# Patient Record
Sex: FEMALE | Race: WHITE | NOT HISPANIC OR LATINO | Employment: OTHER | URBAN - METROPOLITAN AREA
[De-identification: names, ages, dates, MRNs, and addresses within clinical notes are randomized per-mention and may not be internally consistent; named-entity substitution may affect disease eponyms.]

---

## 2019-01-07 ENCOUNTER — TELEPHONE (OUTPATIENT)
Dept: ORTHOPEDICS | Facility: CLINIC | Age: 64
End: 2019-01-07

## 2019-01-07 DIAGNOSIS — M25.561 RIGHT KNEE PAIN, UNSPECIFIED CHRONICITY: Primary | ICD-10-CM

## 2019-01-08 ENCOUNTER — OFFICE VISIT (OUTPATIENT)
Dept: ORTHOPEDICS | Facility: CLINIC | Age: 64
End: 2019-01-08
Payer: MEDICAID

## 2019-01-08 VITALS — HEIGHT: 66 IN | BODY MASS INDEX: 35.36 KG/M2 | WEIGHT: 220 LBS

## 2019-01-08 DIAGNOSIS — G89.29 CHRONIC PAIN OF RIGHT KNEE: Primary | ICD-10-CM

## 2019-01-08 DIAGNOSIS — M25.561 CHRONIC PAIN OF RIGHT KNEE: Primary | ICD-10-CM

## 2019-01-08 DIAGNOSIS — M17.11 PRIMARY OSTEOARTHRITIS OF RIGHT KNEE: ICD-10-CM

## 2019-01-08 PROCEDURE — 20611 PR DRAIN/ASP/INJECT MAJOR JOINT/BURSA W/US GUIDANCE: ICD-10-PCS | Mod: S$PBB,RT,, | Performed by: ORTHOPAEDIC SURGERY

## 2019-01-08 PROCEDURE — 20611 DRAIN/INJ JOINT/BURSA W/US: CPT | Mod: S$PBB,RT,, | Performed by: ORTHOPAEDIC SURGERY

## 2019-01-08 PROCEDURE — 99999 PR PBB SHADOW E&M-EST. PATIENT-LVL II: CPT | Mod: PBBFAC,,, | Performed by: ORTHOPAEDIC SURGERY

## 2019-01-08 PROCEDURE — 99204 PR OFFICE/OUTPT VISIT, NEW, LEVL IV, 45-59 MIN: ICD-10-PCS | Mod: 25,S$PBB,, | Performed by: ORTHOPAEDIC SURGERY

## 2019-01-08 PROCEDURE — 99999 PR PBB SHADOW E&M-EST. PATIENT-LVL II: ICD-10-PCS | Mod: PBBFAC,,, | Performed by: ORTHOPAEDIC SURGERY

## 2019-01-08 PROCEDURE — 99212 OFFICE O/P EST SF 10 MIN: CPT | Mod: PBBFAC,PN | Performed by: ORTHOPAEDIC SURGERY

## 2019-01-08 PROCEDURE — 20611 DRAIN/INJ JOINT/BURSA W/US: CPT | Mod: PBBFAC,PN | Performed by: ORTHOPAEDIC SURGERY

## 2019-01-08 PROCEDURE — 99204 OFFICE O/P NEW MOD 45 MIN: CPT | Mod: 25,S$PBB,, | Performed by: ORTHOPAEDIC SURGERY

## 2019-01-08 RX ORDER — AMLODIPINE BESYLATE 10 MG/1
5 TABLET ORAL DAILY
COMMUNITY
End: 2019-11-18 | Stop reason: SDUPTHER

## 2019-01-08 RX ORDER — TRIAMCINOLONE ACETONIDE 40 MG/ML
40 INJECTION, SUSPENSION INTRA-ARTICULAR; INTRAMUSCULAR
Status: SHIPPED | OUTPATIENT
Start: 2019-01-08

## 2019-01-08 RX ORDER — MELOXICAM 7.5 MG/1
7.5 TABLET ORAL DAILY
COMMUNITY
End: 2019-11-18

## 2019-01-08 RX ORDER — LISINOPRIL AND HYDROCHLOROTHIAZIDE 12.5; 2 MG/1; MG/1
1 TABLET ORAL 2 TIMES DAILY
COMMUNITY
End: 2019-11-18 | Stop reason: SDUPTHER

## 2019-01-08 RX ORDER — GABAPENTIN 300 MG/1
300 CAPSULE ORAL DAILY
COMMUNITY
End: 2019-11-18

## 2019-01-08 RX ORDER — CETIRIZINE HYDROCHLORIDE 10 MG/1
10 TABLET ORAL DAILY
COMMUNITY
End: 2019-11-18 | Stop reason: SDUPTHER

## 2019-01-08 RX ORDER — LOVASTATIN 20 MG/1
20 TABLET ORAL NIGHTLY
COMMUNITY
End: 2019-02-04

## 2019-01-08 RX ORDER — OMEPRAZOLE 20 MG/1
20 CAPSULE, DELAYED RELEASE ORAL DAILY
COMMUNITY
End: 2019-11-18 | Stop reason: SDUPTHER

## 2019-01-08 NOTE — LETTER
January 8, 2019      Sarwat Hernandez, NP  [address] [place] [address]           [facility]  [address]  [address]  [place] [address]  Phone: [phone]  Fax: [phone]          Patient: Yumiko Boyd   MR Number: [id number]   YOB: 1955   Date of Visit: 1/8/2019       Dear Sarwat Hernandez:    Thank you for referring Yumiko Boyd to me for evaluation. Attached you will find relevant portions of my assessment and plan of care.    If you have questions, please do not hesitate to call me. I look forward to following Yumiko Boyd along with you.    Sincerely,    David Trivedi, DO    Enclosure  CC:  No Recipients    If you would like to receive this communication electronically, please contact [email or web address] or [phone] to request more information on [employer] access.    For providers and/or their staff who would like to refer a patient to [facility], please contact us through our one-stop-shop provider referral line, [facility] , at [phone].    If you feel you have received this communication in error or would no longer like to receive these types of communications, please e-mail [email or web address]

## 2019-01-08 NOTE — PROGRESS NOTES
"CC: right knee pain    63 y.o. Female presents today for evaluation of her right knee pain.   How long: Patient states her knee has been bothering her now for a couple months. Patient describes her pain as "burning" and is on medial portion of her knee. She states this will keep her up at night. Patient denies any injury or trauma. She went to the ED on 12/16/2018 for knee pain. She states that she woke up to go to the bathroom and was almost unable to make it to the bathroom due to her extreme pain and feeling like her knee was going to give out. She states that she went to the ED because she was concerned that she would break her hip if she fell.  What makes it better: Patient states she feels better when taking 300mg Gabapentin at night. She will soak in a warm bath which also makes her feel better.   What makes it worse: Patient states she feels the worst when standing and walking. Patient states she has a hard time coming downstairs.  Does it radiate: Patient reports occasional radiating pain into her lower leg and calf. She states sometimes it will radiate up into her hip.   Attempted treatments: Patient is using Voltaren gel and states it will take the burning away but not her pain. Patient was also prescribed Mobic 7.5 for her knee pain but states it is not working. Patient was given a shot of Toradol while at the ED but states before she left it had worn off. Patient states once a week she will take Tylenol or Ibuprofen if needed. Patient states she feels better when soaking in a warm bath.   Pain score: Patient states her pain is 6/10 today.   Any mechanical symptoms: Patient states every so often she will hear a pop in her knee.   Feelings of instability: Patient reports she will stumble every once in a while and catch herself. She states when walking she will reach for supports.   Affect on ADLs: Patient states she is taking more breaks from activities due to pain.     REVIEW OF SYSTEMS:   Constitution: " Patient denies fever, chills, night sweats, and weight changes.  Eyes: Patient denies eye pain or vision changes.  HENT: Patient denies headache, ear pain, sore throat, or nasal discharge.  CVS: Patient denies chest pain.  Lungs: Patient denies shortness of breath or cough.  Abd: Patient denies stomach pain, nausea, or vomiting.  Skin: Patient denies skin rash or itching.    Hematologic/Lymphatic: Patient denies easy bruising.   Musculoskeletal: Patient denies recent falls. See HPI.  Psych: Patient denies any current anxiety or nervousness.    PAST MEDICAL HISTORY:   History reviewed. No pertinent past medical history.    PAST SURGICAL HISTORY:   History reviewed. No pertinent surgical history.    FAMILY HISTORY:   History reviewed. No pertinent family history.    SOCIAL HISTORY:   Social History     Socioeconomic History    Marital status:      Spouse name: Not on file    Number of children: Not on file    Years of education: Not on file    Highest education level: Not on file   Social Needs    Financial resource strain: Not on file    Food insecurity - worry: Not on file    Food insecurity - inability: Not on file    Transportation needs - medical: Not on file    Transportation needs - non-medical: Not on file   Occupational History    Not on file   Tobacco Use    Smoking status: Not on file   Substance and Sexual Activity    Alcohol use: Not on file    Drug use: Not on file    Sexual activity: Not on file   Other Topics Concern    Not on file   Social History Narrative    Not on file       MEDICATIONS:     Current Outpatient Medications:     amLODIPine (NORVASC) 10 MG tablet, Take 10 mg by mouth once daily., Disp: , Rfl:     cetirizine (ZYRTEC) 10 MG tablet, Take 10 mg by mouth once daily., Disp: , Rfl:     gabapentin (NEURONTIN) 300 MG capsule, Take 300 mg by mouth once daily., Disp: , Rfl:     lisinopril-hydrochlorothiazide (PRINZIDE,ZESTORETIC) 20-12.5 mg per tablet, Take 1 tablet  "by mouth 2 (two) times daily., Disp: , Rfl:     lovastatin (MEVACOR) 20 MG tablet, Take 20 mg by mouth every evening., Disp: , Rfl:     meloxicam (MOBIC) 7.5 MG tablet, Take 7.5 mg by mouth once daily., Disp: , Rfl:     omeprazole (PRILOSEC) 20 MG capsule, Take 20 mg by mouth once daily., Disp: , Rfl:     Current Facility-Administered Medications:     triamcinolone acetonide injection 40 mg, 40 mg, Intra-articular, 1 time in Clinic/HOD, David Trivedi DO    ALLERGIES:   Review of patient's allergies indicates:  No Known Allergies     PHYSICAL EXAMINATION:  Ht 5' 6" (1.676 m)   Wt 99.8 kg (220 lb)   BMI 35.51 kg/m²   Vitals signs and nursing note have been reviewed.  General: In no acute distress, well developed, well nourished, no diaphoresis  Eyes: EOM full and smooth, no eye redness or discharge  HENT: normocephalic and atraumatic, neck supple, trachea midline, no nasal discharge, no external ear redness or discharge  Cardiovascular: 2+ and symmetric DP pulses bilaterally, no LE edema  Lungs: respirations non-labored, no conversational dyspnea   Abd: non-distended, no rigidity  MSK: no amputation or deformity, no swelling of extremities  Neuro: AAOx3, CN2-12 grossly intact  Skin: No rashes, warm and dry  Psychiatric: cooperative, pleasant, mood and affect appropriate for age    MUSCULOSKELETAL EXAM:    RIGHT KNEE EXAMINATION   Affected side is compared to contralateral knee     Observation:  No edema, erythema, ecchymosis, or effusion noted.  No muscle atrophy of the thighs and calves noted.  No obvious bony deformities noted.   No genu valgus/varum noted. No recurvatum noted.    No tibial internal/external torsion.      Tenderness:  Patella - none    Lateral joint line - none  Quad tendon - none   Medial joint line - present  Patellar tendon - none   Medial plica - none  Tibial tubercle - none   Lateral plica - none  Pes anserine - none   MCL prox - none  Distal ITB - none   MCL distal - none  MFC - " none    LCL prox - none  LFC - none    LCL distal - none  Tibia - none    Fibula - none    No obvious bursae, plicae, popliteal cysts, or tendon derangement palpated.          ROM:   Active extension to 0° on left without hyperextension, lag, or patellar J sign.   Active extension to 0° on right without hyperextension, lag, or patellar J sign.   Crepitus present bilaterally with extension  Active flexion to 135° on left and 135° on right.    Strength: (bilaterally)  Knee Flexion - 5/5 on left and 5/5 on right  Knee Extension - 5/5 on left and 5/5 on right  Hip Flexion - 5/5 on left and 5/5 on right  Hip Extension - 5/5 on left and 5/5 on right  Ankle dorsiflexion - 5/5 on left and 5/5 on right  Ankle Plantarflexion - 5/5 on left and 5/5 on right    Patellofemoral Exam:  Patellar ballottement - negative  Bulge sign - negative  Patellar grind - positive  No patellar laxity with medial and lateral translation   No apprehension with medial and lateral patellar translation.     Neurovascular Examination:   Normal gait without antalgia.  Sensation intact to light touch in the obturator, lateral/intermediate/medial/posterior femoral cutaneous, saphenous, and common peroneal nerves bilaterally.  Pulses intact at the DP and PT arteries bilaterally.    Capillary refill intact <2 seconds in all toes bilaterally.    IMAGIN. X-ray ordered due to right knee pain.  2. X-ray images were reviewed personally by me and then directly with patient.  3. FINDINGS: X-ray images obtained demonstrate some osteophyte formation most prominent on the medial compartments.. There is moderate joint space narrowing of both knees, most prominent in the medial and patellofemoral compartments.  4. IMPRESSION:  Moderate arthritic degenerative changes of both knees.    PROCEDURE:  Large Joint Aspiration/Injection  Knee joint, right  Performed by: JACEY DAVALOS  Authorized by: JACEY DAVALOS  Consent Done?: Yes (Verbal and writte)  Indications:  Pain  Site marked: The procedure site was marked   Timeout: Prior to procedure the correct patient, procedure, and site was verified   Location: Knee joint, right  Prep: Patient was prepped with Chlorhexidine and alcohol.  Skin anesthetic: Ethyl Chloride spray was used prior to skin puncture.  Ultrasound Guidance for needle placement: yes  Procedure: After local anesthetic was applied, the 25G, 1.5 needle was used to enter the knee joint capsule under US guidance. A 3 cc mixture of 1 cc of 40 mg/ml triamcinolone acetonide and 2 cc of 0.5% Bupivacaine Hydrochloride were injected into the knee joint.   Approach: superiorlateral  Medications: 40 mg triamcinolone acetonide 40 mg/mL  Patient tolerance: Patient tolerated the procedure well with no immediate complications    Ultrasound guidance was used for needle localization. Images were saved and stored for documentation. Short and long axis images of the anterior knee were taken prior to injection confirming presence of joint effusion. Dynamic visualization of the needle was continuous throughout the procedures.    Triamcinolone:  NDC: [address]-5  LOT: [id number]  EXP: 09/2020      ASSESSMENT:      ICD-10-CM ICD-9-CM   1. Chronic pain of right knee M25.561 719.46    G89.29 338.29   2. Primary osteoarthritis of right knee M17.11 715.16       PLAN:  1-2.  Right knee pain/OA -     - XRs ordered in the office today and images were personally reviewed with the patient. See above for further detail.    - We reviewed the natural history of osteoarthritis and a multipronged treatment approach. We reviewed the importance of addressing three different aspects to best manage this condition. Controlling the intra-articular immune reaction through medications and/or injections, improving local stability through bracing and/or injection, and improving functional stability through hip, core, and ankle strength, stability and mobility which may benefit from formal physical  therapy.    - We discussed options today, including medications, bracing, home exercises, physical therapy, injections. She has 2 sisters that have had some of these treatments done before, and today she states that she is interested in injections and bracing.    - She was fitted for a hinged knee brace in clinic today. She is looking forward to wearing this as she feels instability is a big issue for her.    - She also elected to have a cortisone injection done into her right knee. See above for further procedure detail.    - Continue with Mobic 7.5 mg daily.  Also continue with Tylenol as needed, but not to exceed 3000 mg daily.      Future planning includes - if not improved with above, consider home exercise program and formal physical therapy.  Visco injection series may be a good idea for her as well.    All questions were answered to the best of my ability and all concerns were addressed at this time.    Follow up in 4-6 weeks for above, or sooner if needed.

## 2019-02-04 ENCOUNTER — OFFICE VISIT (OUTPATIENT)
Dept: ORTHOPEDICS | Facility: CLINIC | Age: 64
End: 2019-02-04
Payer: MEDICAID

## 2019-02-04 VITALS
WEIGHT: 218 LBS | SYSTOLIC BLOOD PRESSURE: 140 MMHG | DIASTOLIC BLOOD PRESSURE: 70 MMHG | BODY MASS INDEX: 35.03 KG/M2 | HEIGHT: 66 IN

## 2019-02-04 DIAGNOSIS — M25.562 CHRONIC PAIN OF LEFT KNEE: ICD-10-CM

## 2019-02-04 DIAGNOSIS — M17.0 OSTEOARTHRITIS OF BOTH KNEES, UNSPECIFIED OSTEOARTHRITIS TYPE: ICD-10-CM

## 2019-02-04 DIAGNOSIS — G89.29 CHRONIC PAIN OF RIGHT KNEE: Primary | ICD-10-CM

## 2019-02-04 DIAGNOSIS — M25.561 CHRONIC PAIN OF RIGHT KNEE: Primary | ICD-10-CM

## 2019-02-04 DIAGNOSIS — G89.29 CHRONIC PAIN OF LEFT KNEE: ICD-10-CM

## 2019-02-04 PROCEDURE — 20611 PR DRAIN/ASP/INJECT MAJOR JOINT/BURSA W/US GUIDANCE: ICD-10-PCS | Mod: S$PBB,RT,, | Performed by: ORTHOPAEDIC SURGERY

## 2019-02-04 PROCEDURE — 99213 OFFICE O/P EST LOW 20 MIN: CPT | Mod: 25,S$PBB,, | Performed by: ORTHOPAEDIC SURGERY

## 2019-02-04 PROCEDURE — 99212 OFFICE O/P EST SF 10 MIN: CPT | Mod: PBBFAC,PN | Performed by: ORTHOPAEDIC SURGERY

## 2019-02-04 PROCEDURE — 99213 PR OFFICE/OUTPT VISIT, EST, LEVL III, 20-29 MIN: ICD-10-PCS | Mod: 25,S$PBB,, | Performed by: ORTHOPAEDIC SURGERY

## 2019-02-04 PROCEDURE — 99999 PR PBB SHADOW E&M-EST. PATIENT-LVL II: ICD-10-PCS | Mod: PBBFAC,,, | Performed by: ORTHOPAEDIC SURGERY

## 2019-02-04 PROCEDURE — 99999 PR PBB SHADOW E&M-EST. PATIENT-LVL II: CPT | Mod: PBBFAC,,, | Performed by: ORTHOPAEDIC SURGERY

## 2019-02-04 PROCEDURE — 20611 DRAIN/INJ JOINT/BURSA W/US: CPT | Mod: S$PBB,RT,, | Performed by: ORTHOPAEDIC SURGERY

## 2019-02-04 PROCEDURE — 20611 DRAIN/INJ JOINT/BURSA W/US: CPT | Mod: PBBFAC,PN | Performed by: ORTHOPAEDIC SURGERY

## 2019-02-04 RX ORDER — TRIAMCINOLONE ACETONIDE 40 MG/ML
40 INJECTION, SUSPENSION INTRA-ARTICULAR; INTRAMUSCULAR
Status: SHIPPED | OUTPATIENT
Start: 2019-02-04

## 2019-02-04 NOTE — PROGRESS NOTES
CC: right knee pain    63 y.o. Female presents today for follow up evaluation of her right knee pain.     Change since last visit: Patient states that since her last visit she has been feeling much better. She recently went on a cruise and stated her right and left knee pain came back during this time, but attributes this to having to walk sideways on the boat to keep balanced. She states once the cruise was over she felt much better.   Attempted treatments: Patient states she has only had to take Gabapentin once since we last saw her. She feels great relief from the steroid injection at her last visit and is interested in getting one in her other knee as this knee bothers her as well. Patient states she is wearing her brace as needed. Patient states when her knee is aggravated she will get in a warm bath and this helps her pain.   Pain score: Patient states her right knee pain is 0/10 today. Her left knee pain currently not painful while she is seated, but with walking in the parking lot it will get worse..  Affecting ADLs: Patient states she is doing all of her ADLs without issue. She states her pain is much more manageable and she is able to sleep through the night now.      REVIEW OF SYSTEMS:   Constitution: Patient denies fever or chills.  Eyes: Patient denies eye pain or vision changes.  CVS: Patient denies chest pain.  Lungs: Patient denies shortness of breath or cough.  Skin: Patient denies skin rash or itching.    Musculoskeletal: Patient denies recent falls. See HPI.  Psych: Patient denies any current anxiety or nervousness.    PAST MEDICAL HISTORY:   History reviewed. No pertinent past medical history.    MEDICATIONS:     Current Outpatient Medications:     amLODIPine (NORVASC) 10 MG tablet, Take 10 mg by mouth once daily., Disp: , Rfl:     cetirizine (ZYRTEC) 10 MG tablet, Take 10 mg by mouth once daily., Disp: , Rfl:     gabapentin (NEURONTIN) 300 MG capsule, Take 300 mg by mouth once daily., Disp: ,  "Rfl:     lisinopril-hydrochlorothiazide (PRINZIDE,ZESTORETIC) 20-12.5 mg per tablet, Take 1 tablet by mouth 2 (two) times daily., Disp: , Rfl:     meloxicam (MOBIC) 7.5 MG tablet, Take 7.5 mg by mouth once daily., Disp: , Rfl:     omeprazole (PRILOSEC) 20 MG capsule, Take 20 mg by mouth once daily., Disp: , Rfl:     Current Facility-Administered Medications:     triamcinolone acetonide injection 40 mg, 40 mg, Intra-articular, 1 time in Clinic/HOD, David Trivedi DO    ALLERGIES:   Review of patient's allergies indicates:  No Known Allergies     PHYSICAL EXAMINATION:  BP (!) 140/70 (BP Location: Left arm, Patient Position: Sitting, BP Method: Medium (Manual))   Ht 5' 6" (1.676 m)   Wt 98.9 kg (218 lb)   BMI 35.19 kg/m²   Vitals signs and nursing note have been reviewed.  General: In no acute distress, well developed, well nourished, no diaphoresis  Eyes: EOM full and smooth, no eye redness or discharge  HENT: normocephalic and atraumatic, neck supple, trachea midline, no nasal discharge  Cardiovascular: no LE edema  Lungs: respirations non-labored, no conversational dyspnea   Neuro: AAOx3, CN2-12 grossly intact  Skin: No rashes, warm and dry  Psychiatric: cooperative, pleasant, mood and affect appropriate for age    MUSCULOSKELETAL EXAM:    LEFT KNEE EXAMINATION   Affected side is compared to contralateral knee     Observation:  No edema, erythema, ecchymosis, or effusion noted.  No muscle atrophy of the thighs and calves noted.  No obvious bony deformities noted.   No patella harrison or baja noted.  No genu valgus/varum noted. No recurvatum noted.    No tibial internal/external torsion.      Tenderness:  Patella - none    Lateral joint line - present  Quad tendon - none   Medial joint line - present  Patellar tendon - none  Medial plica - none  Tibial tubercle - none   Lateral plica - none  Pes anserine - none   MCL prox - none  Distal ITB - none   MCL distal - none  MFC - none    LCL prox - none  LFC - " none    LCL distal - none  Tibia - none    Fibula - none    No obvious bursae, plicae, popliteal cysts, or tendon derangement palpated.          ROM:   Active extension to 0° bilaterally without hyperextension, lag, or patellar J sign.    Crepitus present bilaterally with extension.  Active flexion to 135° bilaterally.    Strength: (bilaterally)  Knee Flexion - 5/5  Knee Extension - 5/5  Hip Flexion - 5/5  Hip Extension - 5/5  Ankle dorsiflexion - 5/5  Ankle Plantarflexion - 5/5    Patellofemoral Exam:  Patellar ballottement - negative  Bulge sign - negative  Patellar grind - positive bilaterally  No patellar laxity with medial and lateral translation   No apprehension with medial and lateral patellar translation.     Neurovascular Examination:   Normal gait without antalgia.  Sensation intact to light touch in the obturator, lateral/intermediate/medial/posterior femoral cutaneous, saphenous, and common peroneal nerves bilaterally.  Pulses intact at the DP and PT arteries bilaterally.    Capillary refill intact <2 seconds in all toes bilaterally.    PROCEDURE:  Large Joint Injection  Knee joint, left  Performed by: JACEY DAVALOS  Authorized by: JACEY DAVALOS  Consent Done?: Yes (Verbal and written)  Indications: Pain  Site marked: The procedure site was marked   Timeout: Prior to procedure the correct patient, procedure, and site was verified     Location: Knee joint, right  Prep: Patient was prepped with Chlorhexidine and alcohol.  Skin anesthetic: Ethyl Chloride spray was used prior to skin puncture.  Ultrasound Guidance for needle placement: yes  Procedure: After local anesthetic was applied, the 22G, 1.5 needle was used to enter the knee joint capsule under US guidance. A 3 cc mixture of 1 cc of 40 mg/ml triamcinolone acetonide and 2 cc of 0.25% Bupivacaine Hydrochloride were injected into the knee joint.   Approach: superiorlateral  Medications: 40 mg triamcinolone acetonide 40 mg/mL  Patient tolerance:  Patient tolerated the procedure well with no immediate complications    Ultrasound guidance was used for needle localization. Images were saved and stored for documentation. Short and long axis images of the anterior knee were taken prior to injection confirming presence of joint effusion. Dynamic visualization of the needle was continuous throughout the procedures.    Triamcinolone:  NDC: [address]-8  LOT: [id number]  EXP: 09/2020      ASSESSMENT:      ICD-10-CM ICD-9-CM   1. Chronic pain of right knee M25.561 719.46    G89.29 338.29   2. Chronic pain of left knee M25.562 719.46    G89.29 338.29   3. Osteoarthritis of both knees, unspecified osteoarthritis type M17.0 715.96       PLAN:  1.  Chronic pain of right knee - improved    - Yumiko had a cortisone injection done on 01/08/2019 and admits to continued improvement since then.  She denies any problems with her pain at this time.  She states that she has not needed to take the Mobic 7.5 mg that was prescribed at the last visit and she is needing her brace less and less as she is in less pain.    - She was counseled that we can repeat cortisone injections every 3 months if needed.  She was also educated on Euflexxa injections if the cortisone starts to lose its efficacy.  She expressed understanding to her injection options at this time.      2-3.  Chronic pain of left knee/osteoarthritis of both knees - new     - She states that she wishes she had her left knee injected at her last visit, but was concerned that she would be in a lot of pain having both injected. She admits to her left knee giving her more pain now, especially since her right knee is feeling somewhat better.  She consents to a cortisone injection today.  See above for procedure details.    - If symptoms of either knee flare up, I recommend returning to the brace and taking the Mobic to help with the inflammation/pain.  She expressed understanding and will do this.  She was advised to continue  with her home exercises as well to help prevent the pain from returning.      Future planning includes - no further action needed at this time.    All questions were answered to the best of my ability and all concerns were addressed at this time.    Follow up PRN for above, or if new symptoms arise.  Next possible date for cortisone injection would be approximately 04/08/2019.

## 2019-04-22 ENCOUNTER — TELEPHONE (OUTPATIENT)
Dept: SPORTS MEDICINE | Facility: CLINIC | Age: 64
End: 2019-04-22

## 2019-04-22 NOTE — TELEPHONE ENCOUNTER
Called and let patient know that she could not be seen in the [facility] due to her insurance. The next time Dr. Trivedi would be available in [place] would be in May 13 and she stated she would be on a cruise. She wanted to repeat her cortisone injections into her knees but unfortunately would not be able to get in until after her cruise. She was not pleased with this and said she would call when she gets home to reschedule.

## 2019-11-18 ENCOUNTER — OFFICE VISIT (OUTPATIENT)
Dept: FAMILY MEDICINE | Facility: CLINIC | Age: 64
End: 2019-11-18
Payer: MEDICARE

## 2019-11-18 ENCOUNTER — LAB VISIT (OUTPATIENT)
Dept: LAB | Facility: HOSPITAL | Age: 64
End: 2019-11-18
Attending: INTERNAL MEDICINE
Payer: MEDICARE

## 2019-11-18 VITALS
OXYGEN SATURATION: 97 % | SYSTOLIC BLOOD PRESSURE: 138 MMHG | WEIGHT: 207.81 LBS | HEART RATE: 79 BPM | DIASTOLIC BLOOD PRESSURE: 70 MMHG | BODY MASS INDEX: 33.4 KG/M2 | TEMPERATURE: 98 F | HEIGHT: 66 IN

## 2019-11-18 DIAGNOSIS — I10 BENIGN ESSENTIAL HYPERTENSION: ICD-10-CM

## 2019-11-18 DIAGNOSIS — Z11.59 NEED FOR HEPATITIS C SCREENING TEST: ICD-10-CM

## 2019-11-18 DIAGNOSIS — Z23 NEED FOR PNEUMOCOCCAL VACCINATION: ICD-10-CM

## 2019-11-18 DIAGNOSIS — M17.0 PRIMARY OSTEOARTHRITIS OF BOTH KNEES: ICD-10-CM

## 2019-11-18 DIAGNOSIS — I10 BENIGN ESSENTIAL HYPERTENSION: Primary | ICD-10-CM

## 2019-11-18 DIAGNOSIS — J30.9 CHRONIC ALLERGIC RHINITIS: ICD-10-CM

## 2019-11-18 DIAGNOSIS — E66.09 CLASS 1 OBESITY DUE TO EXCESS CALORIES WITH SERIOUS COMORBIDITY AND BODY MASS INDEX (BMI) OF 33.0 TO 33.9 IN ADULT: ICD-10-CM

## 2019-11-18 DIAGNOSIS — E78.2 MIXED HYPERLIPIDEMIA: ICD-10-CM

## 2019-11-18 DIAGNOSIS — R79.9 ABNORMAL FINDING OF BLOOD CHEMISTRY, UNSPECIFIED: ICD-10-CM

## 2019-11-18 DIAGNOSIS — Z23 NEED FOR SHINGLES VACCINE: ICD-10-CM

## 2019-11-18 DIAGNOSIS — Z23 NEED FOR INFLUENZA VACCINATION: ICD-10-CM

## 2019-11-18 DIAGNOSIS — Z23 NEED FOR TD VACCINE: ICD-10-CM

## 2019-11-18 DIAGNOSIS — Z12.31 SCREENING MAMMOGRAM, ENCOUNTER FOR: ICD-10-CM

## 2019-11-18 DIAGNOSIS — K21.00 GASTROESOPHAGEAL REFLUX DISEASE WITH ESOPHAGITIS: ICD-10-CM

## 2019-11-18 LAB
ALBUMIN SERPL BCP-MCNC: 3.7 G/DL (ref 3.5–5.2)
ALP SERPL-CCNC: 108 U/L (ref 55–135)
ALT SERPL W/O P-5'-P-CCNC: 15 U/L (ref 10–44)
ANION GAP SERPL CALC-SCNC: 9 MMOL/L (ref 8–16)
AST SERPL-CCNC: 21 U/L (ref 10–40)
BASOPHILS # BLD AUTO: 0.06 K/UL (ref 0–0.2)
BASOPHILS NFR BLD: 0.8 % (ref 0–1.9)
BILIRUB SERPL-MCNC: 0.4 MG/DL (ref 0.1–1)
BUN SERPL-MCNC: 11 MG/DL (ref 8–23)
CALCIUM SERPL-MCNC: 9.5 MG/DL (ref 8.7–10.5)
CHLORIDE SERPL-SCNC: 107 MMOL/L (ref 95–110)
CHOLEST SERPL-MCNC: 158 MG/DL (ref 120–199)
CHOLEST/HDLC SERPL: 3.2 {RATIO} (ref 2–5)
CO2 SERPL-SCNC: 29 MMOL/L (ref 23–29)
CREAT SERPL-MCNC: 1 MG/DL (ref 0.5–1.4)
DIFFERENTIAL METHOD: ABNORMAL
EOSINOPHIL # BLD AUTO: 0.1 K/UL (ref 0–0.5)
EOSINOPHIL NFR BLD: 1.8 % (ref 0–8)
ERYTHROCYTE [DISTWIDTH] IN BLOOD BY AUTOMATED COUNT: 12.5 % (ref 11.5–14.5)
EST. GFR  (AFRICAN AMERICAN): >60 ML/MIN/1.73 M^2
EST. GFR  (NON AFRICAN AMERICAN): 59.7 ML/MIN/1.73 M^2
ESTIMATED AVG GLUCOSE: 117 MG/DL (ref 68–131)
GLUCOSE SERPL-MCNC: 85 MG/DL (ref 70–110)
HBA1C MFR BLD HPLC: 5.7 % (ref 4–5.6)
HCT VFR BLD AUTO: 45.5 % (ref 37–48.5)
HDLC SERPL-MCNC: 50 MG/DL (ref 40–75)
HDLC SERPL: 31.6 % (ref 20–50)
HGB BLD-MCNC: 15 G/DL (ref 12–16)
IMM GRANULOCYTES # BLD AUTO: 0.02 K/UL (ref 0–0.04)
IMM GRANULOCYTES NFR BLD AUTO: 0.3 % (ref 0–0.5)
LDLC SERPL CALC-MCNC: 52.6 MG/DL (ref 63–159)
LYMPHOCYTES # BLD AUTO: 2.6 K/UL (ref 1–4.8)
LYMPHOCYTES NFR BLD: 33.1 % (ref 18–48)
MCH RBC QN AUTO: 31.1 PG (ref 27–31)
MCHC RBC AUTO-ENTMCNC: 33 G/DL (ref 32–36)
MCV RBC AUTO: 94 FL (ref 82–98)
MONOCYTES # BLD AUTO: 0.8 K/UL (ref 0.3–1)
MONOCYTES NFR BLD: 9.7 % (ref 4–15)
NEUTROPHILS # BLD AUTO: 4.2 K/UL (ref 1.8–7.7)
NEUTROPHILS NFR BLD: 54.3 % (ref 38–73)
NONHDLC SERPL-MCNC: 108 MG/DL
NRBC BLD-RTO: 0 /100 WBC
PLATELET # BLD AUTO: 280 K/UL (ref 150–350)
PMV BLD AUTO: 10.9 FL (ref 9.2–12.9)
POTASSIUM SERPL-SCNC: 4.3 MMOL/L (ref 3.5–5.1)
PROT SERPL-MCNC: 7.5 G/DL (ref 6–8.4)
RBC # BLD AUTO: 4.83 M/UL (ref 4–5.4)
SODIUM SERPL-SCNC: 145 MMOL/L (ref 136–145)
TRIGL SERPL-MCNC: 277 MG/DL (ref 30–150)
WBC # BLD AUTO: 7.73 K/UL (ref 3.9–12.7)

## 2019-11-18 PROCEDURE — 90471 IMMUNIZATION ADMIN: CPT | Mod: S$GLB,,, | Performed by: INTERNAL MEDICINE

## 2019-11-18 PROCEDURE — 90750 HZV VACC RECOMBINANT IM: CPT | Mod: S$GLB,,, | Performed by: INTERNAL MEDICINE

## 2019-11-18 PROCEDURE — 99999 PR PBB SHADOW E&M-EST. PATIENT-LVL IV: ICD-10-PCS | Mod: PBBFAC,,, | Performed by: INTERNAL MEDICINE

## 2019-11-18 PROCEDURE — 3075F PR MOST RECENT SYSTOLIC BLOOD PRESS GE 130-139MM HG: ICD-10-PCS | Mod: CPTII,S$GLB,, | Performed by: INTERNAL MEDICINE

## 2019-11-18 PROCEDURE — 90686 IIV4 VACC NO PRSV 0.5 ML IM: CPT | Mod: S$GLB,,, | Performed by: INTERNAL MEDICINE

## 2019-11-18 PROCEDURE — G0008 FLU VACCINE (QUAD) GREATER THAN OR EQUAL TO 3YO PRESERVATIVE FREE IM: ICD-10-PCS | Mod: 59,S$GLB,, | Performed by: INTERNAL MEDICINE

## 2019-11-18 PROCEDURE — 85025 COMPLETE CBC W/AUTO DIFF WBC: CPT

## 2019-11-18 PROCEDURE — 3008F BODY MASS INDEX DOCD: CPT | Mod: CPTII,S$GLB,, | Performed by: INTERNAL MEDICINE

## 2019-11-18 PROCEDURE — 90471 ZOSTER RECOMBINANT VACCINE: ICD-10-PCS | Mod: S$GLB,,, | Performed by: INTERNAL MEDICINE

## 2019-11-18 PROCEDURE — 99999 PR PBB SHADOW E&M-EST. PATIENT-LVL IV: CPT | Mod: PBBFAC,,, | Performed by: INTERNAL MEDICINE

## 2019-11-18 PROCEDURE — 3078F PR MOST RECENT DIASTOLIC BLOOD PRESSURE < 80 MM HG: ICD-10-PCS | Mod: CPTII,S$GLB,, | Performed by: INTERNAL MEDICINE

## 2019-11-18 PROCEDURE — 86803 HEPATITIS C AB TEST: CPT

## 2019-11-18 PROCEDURE — 3008F PR BODY MASS INDEX (BMI) DOCUMENTED: ICD-10-PCS | Mod: CPTII,S$GLB,, | Performed by: INTERNAL MEDICINE

## 2019-11-18 PROCEDURE — 3075F SYST BP GE 130 - 139MM HG: CPT | Mod: CPTII,S$GLB,, | Performed by: INTERNAL MEDICINE

## 2019-11-18 PROCEDURE — 80061 LIPID PANEL: CPT

## 2019-11-18 PROCEDURE — 99204 PR OFFICE/OUTPT VISIT, NEW, LEVL IV, 45-59 MIN: ICD-10-PCS | Mod: 25,S$GLB,, | Performed by: INTERNAL MEDICINE

## 2019-11-18 PROCEDURE — 90686 FLU VACCINE (QUAD) GREATER THAN OR EQUAL TO 3YO PRESERVATIVE FREE IM: ICD-10-PCS | Mod: S$GLB,,, | Performed by: INTERNAL MEDICINE

## 2019-11-18 PROCEDURE — G0008 ADMIN INFLUENZA VIRUS VAC: HCPCS | Mod: 59,S$GLB,, | Performed by: INTERNAL MEDICINE

## 2019-11-18 PROCEDURE — 3078F DIAST BP <80 MM HG: CPT | Mod: CPTII,S$GLB,, | Performed by: INTERNAL MEDICINE

## 2019-11-18 PROCEDURE — 99204 OFFICE O/P NEW MOD 45 MIN: CPT | Mod: 25,S$GLB,, | Performed by: INTERNAL MEDICINE

## 2019-11-18 PROCEDURE — 90750 ZOSTER RECOMBINANT VACCINE: ICD-10-PCS | Mod: S$GLB,,, | Performed by: INTERNAL MEDICINE

## 2019-11-18 PROCEDURE — 83036 HEMOGLOBIN GLYCOSYLATED A1C: CPT

## 2019-11-18 PROCEDURE — 36415 COLL VENOUS BLD VENIPUNCTURE: CPT | Mod: PO

## 2019-11-18 PROCEDURE — 80053 COMPREHEN METABOLIC PANEL: CPT

## 2019-11-18 RX ORDER — ROSUVASTATIN CALCIUM 20 MG/1
20 TABLET, COATED ORAL DAILY
COMMUNITY
End: 2019-11-18 | Stop reason: SDUPTHER

## 2019-11-18 RX ORDER — LISINOPRIL AND HYDROCHLOROTHIAZIDE 12.5; 2 MG/1; MG/1
1 TABLET ORAL 2 TIMES DAILY
Qty: 90 TABLET | Refills: 1 | Status: SHIPPED | OUTPATIENT
Start: 2019-11-18 | End: 2020-04-27 | Stop reason: SDUPTHER

## 2019-11-18 RX ORDER — AMLODIPINE BESYLATE 5 MG/1
5 TABLET ORAL DAILY
Qty: 90 TABLET | Refills: 1 | Status: SHIPPED | OUTPATIENT
Start: 2019-11-18 | End: 2020-07-17

## 2019-11-18 RX ORDER — OMEPRAZOLE 20 MG/1
20 CAPSULE, DELAYED RELEASE ORAL DAILY
Qty: 90 CAPSULE | Refills: 1 | Status: SHIPPED | OUTPATIENT
Start: 2019-11-18 | End: 2020-07-22 | Stop reason: SDUPTHER

## 2019-11-18 RX ORDER — ROSUVASTATIN CALCIUM 20 MG/1
20 TABLET, COATED ORAL DAILY
Qty: 90 TABLET | Refills: 1 | Status: SHIPPED | OUTPATIENT
Start: 2019-11-18 | End: 2020-07-22 | Stop reason: SDUPTHER

## 2019-11-18 RX ORDER — CETIRIZINE HYDROCHLORIDE 10 MG/1
10 TABLET ORAL DAILY
Qty: 90 TABLET | Refills: 3 | COMMUNITY
Start: 2019-11-18 | End: 2020-05-15

## 2019-11-18 RX ORDER — MELOXICAM 7.5 MG/1
7.5 TABLET ORAL DAILY PRN
Qty: 30 TABLET | Refills: 2 | Status: SHIPPED | OUTPATIENT
Start: 2019-11-18 | End: 2020-05-15

## 2019-11-18 NOTE — PROGRESS NOTES
Patient agrees to all overdue vaccinations, states had a mammogram at [facility]. Also agrees to colonoscopy and pap

## 2019-11-18 NOTE — PROGRESS NOTES
Subjective:        Chief Complaint  Chief Complaint   Patient presents with    Establish Care       HPI  Yumiko Boyd is a 63 y.o. female with multiple medical diagnoses as listed in the medical history and problem list that presents for establishment of care.  Patient is new to me.    Chronic GI problems/GERD  States has had indigestion for many years - states triggered by certain smells/environemtns  Taking Prilosec 20 mg daily - when she does not take the medication she vomits in the evenings    HTN  Taking ACEi/HCTZ    Last Mammo at [facility]    [facility] she [facility]     She does fitness at [facility] - through [employer]    Previously seen by ENT for eardrum evaluation at [facility]    Retired - former manager at [employer]    PAST MEDICAL HISTORY:  Past Medical History:   Diagnosis Date    Hyperlipidemia     Hypertension     Recurrent kidney stones        PAST SURGICAL HISTORY:  Past Surgical History:   Procedure Laterality Date    TUBAL LIGATION  1980s       SOCIAL HISTORY:  Social History     Socioeconomic History    Marital status:      Spouse name: Not on file    Number of children: Not on file    Years of education: Not on file    Highest education level: Not on file   Occupational History    Not on file   Social Needs    Financial resource strain: Not on file    Food insecurity:     Worry: Not on file     Inability: Not on file    Transportation needs:     Medical: Not on file     Non-medical: Not on file   Tobacco Use    Smoking status: Never Smoker    Smokeless tobacco: Never Used   Substance and Sexual Activity    Alcohol use: Yes     Frequency: Monthly or less     Drinks per session: 1 or 2     Binge frequency: Never    Drug use: Never    Sexual activity: Not Currently     Partners: Male     Comment:  since 2000s   Lifestyle    Physical activity:     Days per week: Not on file     Minutes per session: Not on file    Stress: Not on file    Relationships    Social connections:     Talks on phone: Not on file     Gets together: Not on file     Attends Jehovah's witness service: Not on file     Active member of club or organization: Not on file     Attends meetings of clubs or organizations: Not on file     Relationship status: Not on file   Other Topics Concern    Not on file   Social History Narrative    Not on file       FAMILY HISTORY:  Family History   Problem Relation Age of Onset    Hypertension Mother     Stroke Mother     Hyperlipidemia Mother     Colon cancer Father     Heart disease Father     Hyperlipidemia Father     Obesity Sister     Hypertension Sister     Diabetes Mellitus Sister     Hyperlipidemia Brother     Hyperlipidemia Brother     Obesity Sister     Hypertension Sister        ALLERGIES AND MEDICATIONS: updated and reviewed.  Review of patient's allergies indicates:  No Known Allergies  Current Outpatient Medications   Medication Sig Dispense Refill    amLODIPine (NORVASC) 10 MG tablet Take 5 mg by mouth once daily.       cetirizine (ZYRTEC) 10 MG tablet Take 10 mg by mouth once daily.      gabapentin (NEURONTIN) 300 MG capsule Take 300 mg by mouth once daily.      lisinopril-hydrochlorothiazide (PRINZIDE,ZESTORETIC) 20-12.5 mg per tablet Take 1 tablet by mouth 2 (two) times daily.      meloxicam (MOBIC) 7.5 MG tablet Take 7.5 mg by mouth once daily.      rosuvastatin (CRESTOR) 20 MG tablet Take 20 mg by mouth once daily.      omeprazole (PRILOSEC) 20 MG capsule Take 20 mg by mouth once daily.       Current Facility-Administered Medications   Medication Dose Route Frequency Provider Last Rate Last Dose    triamcinolone acetonide injection 40 mg  40 mg Intra-articular 1 time in Clinic/HOD David Trivedi DO        triamcinolone acetonide injection 40 mg  40 mg Intra-articular 1 time in Clinic/HOD DO MARTHA Zelaya  Review of Systems   Constitutional: Negative for appetite change, chills, fever  "and unexpected weight change.   Eyes: Negative.    Respiratory: Negative for cough and shortness of breath.    Cardiovascular: Negative for chest pain, palpitations and leg swelling.   Gastrointestinal: Negative for abdominal pain, constipation, diarrhea, nausea and vomiting.   Endocrine: Negative.    Genitourinary: Negative.    Musculoskeletal: Negative.    Skin: Negative.    Allergic/Immunologic: Negative.    Neurological: Negative for dizziness, light-headedness and headaches.   Psychiatric/Behavioral: Negative for dysphoric mood. The patient is not nervous/anxious.          Objective:     Physical Exam  Vitals:    11/18/19 1401   BP: 138/70   BP Location: Left arm   Patient Position: Sitting   BP Method: Large (Manual)   Pulse: 79   Temp: 98.1 °F (36.7 °C)   TempSrc: Oral   SpO2: 97%   Weight: 94.3 kg (207 lb 12.5 oz)   Height: 5' 6" (1.676 m)    Body mass index is 33.54 kg/m².  Weight: 94.3 kg (207 lb 12.5 oz)   Height: 5' 6" (167.6 cm)   Physical Exam   Constitutional: She appears well-developed. No distress.   HENT:   Head: Normocephalic and atraumatic.   Right Ear: External ear normal.   Left Ear: External ear normal.   Nose: Nose normal.   Mouth/Throat: Oropharynx is clear and moist. No oropharyngeal exudate.   Eyes: Pupils are equal, round, and reactive to light. Conjunctivae and EOM are normal.   Neck: Normal range of motion. Neck supple. No thyromegaly present.   Cardiovascular: Normal rate, normal heart sounds and intact distal pulses. Exam reveals no gallop and no friction rub.   No murmur heard.  Pulmonary/Chest: Effort normal and breath sounds normal. No respiratory distress. She has no wheezes.   Abdominal: Soft. She exhibits no distension and no mass. There is no tenderness. There is no rebound and no guarding. No hernia.   Musculoskeletal: She exhibits deformity (right knee bony hypertrophy). She exhibits no edema.   Lymphadenopathy:     She has no cervical adenopathy.   Neurological: She is " alert. No cranial nerve deficit.   Skin: Skin is warm and dry. No rash noted. No erythema.   Psychiatric: She has a normal mood and affect. Her behavior is normal.   Vitals reviewed.      Assessment:     1. Benign essential hypertension    2. Gastroesophageal reflux disease with esophagitis    3. Primary osteoarthritis of both knees    4. Mixed hyperlipidemia    5. Screening mammogram, encounter for    6. Class 1 obesity due to excess calories with serious comorbidity and body mass index (BMI) of 33.0 to 33.9 in adult    7. Need for Td vaccine    8. Need for pneumococcal vaccination    9. Need for shingles vaccine    10. Need for influenza vaccination    11. Need for hepatitis C screening test      Plan:     Yumiko was seen today for establish care.    Diagnoses and all orders for this visit:    Benign essential hypertension  BP controlled presently - reviewed anti-hypertensive regimen - continue current therapy  -     Hemoglobin A1c; Future  -     amLODIPine (NORVASC) 5 MG tablet; Take 1 tablet (5 mg total) by mouth once daily.  -     lisinopril-hydrochlorothiazide (PRINZIDE,ZESTORETIC) 20-12.5 mg per tablet; Take 1 tablet by mouth 2 (two) times daily.    Gastroesophageal reflux disease with esophagitis  The current medical regimen is effective;  continue present plan and medications.  [facility] with GI at [facility]  -     omeprazole (PRILOSEC) 20 MG capsule; Take 1 capsule (20 mg total) by mouth once daily.    Primary osteoarthritis of both knees  Discussed - NSAIDs used sparingly as needed   No functional limitations  -     meloxicam (MOBIC) 7.5 MG tablet; Take 1 tablet (7.5 mg total) by mouth daily as needed for Pain.    Mixed hyperlipidemia   reviewed anti-hyperlipidemic regimen - continue current therapy  -     Lipid panel; Future  -     Comprehensive metabolic panel; Future  -     CBC auto differential; Future  -     rosuvastatin (CRESTOR) 20 MG tablet; Take 1 tablet (20 mg total) by mouth once  daily.    Screening mammogram, encounter for  Patient states had mammo in Summer 2019 - RIP sent     Class 1 obesity due to excess calories with serious comorbidity and body mass index (BMI) of 33.0 to 33.9 in adult  Body mass index is 33.54 kg/m².  We discussed dietary interventions for the management of weight and reduction of risk for chronic metabolic disease    Need for shingles vaccine  Counseled regarding shingles vaccine - ordered  -     (In Office Administered) Zoster Recombinant Vaccine    Need for influenza vaccination  Counseled regarding seasonal influenza vaccination - administered  -     Influenza - Quadrivalent (PF)    Need for hepatitis C screening test  Patient born between 1946 and 1966 and consents to screening as recommended by [facility] - Hepatitis C screening obtained  -     Hepatitis C antibody; Future    Chronic allergic rhinitis  Discussed symptoms of allergic rhinitis and reviewed treatment modalities, including antihistamines, nasal steroids and advantages/limitations of OTC products (i.e., OTC decongestants, sinus rinse kits). Therapy as noted/per orders.   -     cetirizine (ZYRTEC) 10 MG tablet; Take 1 tablet (10 mg total) by mouth once daily.    Health Maintenance       Date Due Completion Date    Hepatitis C Screening 1955 ---    Lipid Panel 1955 ---    TETANUS VACCINE 11/30/1972 ---    Pap Smear with HPV Cotest 11/30/1975 ---    Mammogram 11/30/1994 ---    Shingles Vaccine (1 of 2) 11/30/2004 ---    Colonoscopy 11/30/2004 ---    Influenza Vaccine (1) 09/01/2019 2/6/2018        Health Maintenance reviewed, addressed as per orders - obtaining outside colonoscopy records from 2016     Follow up in about 4 weeks (around 12/16/2019) for Pap.    The patient expressed understanding and no barriers to adherence were identified.     1. The patient indicates understanding of these issues and agrees with the plan. Brief care plan is updated and reviewed with the patient as applicable.      2. The patient is given an After Visit Summary that lists all medications with directions, allergies, orders placed during this encounter and follow-up instructions.     3. I have reviewed the patient's medical information including past medical, family, and social history sections including the medications and allergies.     4. We discussed the patient's current medications. I reconciled the patient's medication list and prepared and supplied needed refills.       Kurt Limon MD  Internal Medicine-Pediatrics

## 2019-11-18 NOTE — PROGRESS NOTES
Patient received Shingles and Flu  vaccine. Advise  to wait in the lobby and if he has any question let us know . Patient received VIS information Pt. Verbalize understanding

## 2019-11-19 ENCOUNTER — PATIENT OUTREACH (OUTPATIENT)
Dept: ADMINISTRATIVE | Facility: HOSPITAL | Age: 64
End: 2019-11-19

## 2019-11-19 PROBLEM — R73.03 PREDIABETES: Status: ACTIVE | Noted: 2019-11-19

## 2019-11-19 PROBLEM — K21.00 GASTROESOPHAGEAL REFLUX DISEASE WITH ESOPHAGITIS: Chronic | Status: ACTIVE | Noted: 2019-11-18

## 2019-11-19 LAB — HCV AB SERPL QL IA: NEGATIVE

## 2019-12-04 ENCOUNTER — PATIENT OUTREACH (OUTPATIENT)
Dept: ADMINISTRATIVE | Facility: HOSPITAL | Age: 64
End: 2019-12-04

## 2019-12-04 DIAGNOSIS — Z12.31 ENCOUNTER FOR SCREENING MAMMOGRAM FOR MALIGNANT NEOPLASM OF BREAST: ICD-10-CM

## 2019-12-04 DIAGNOSIS — M89.9 DISORDER OF BONE AND CARTILAGE: Primary | ICD-10-CM

## 2019-12-04 DIAGNOSIS — M94.9 DISORDER OF BONE AND CARTILAGE: Primary | ICD-10-CM

## 2019-12-18 ENCOUNTER — OFFICE VISIT (OUTPATIENT)
Dept: FAMILY MEDICINE | Facility: CLINIC | Age: 64
End: 2019-12-18
Payer: MEDICARE

## 2019-12-18 VITALS
HEART RATE: 83 BPM | WEIGHT: 210.31 LBS | HEIGHT: 66 IN | DIASTOLIC BLOOD PRESSURE: 82 MMHG | TEMPERATURE: 99 F | BODY MASS INDEX: 33.8 KG/M2 | OXYGEN SATURATION: 97 % | SYSTOLIC BLOOD PRESSURE: 136 MMHG

## 2019-12-18 DIAGNOSIS — Z01.419 WELL WOMAN EXAM: Primary | ICD-10-CM

## 2019-12-18 DIAGNOSIS — E78.2 MIXED HYPERLIPIDEMIA: ICD-10-CM

## 2019-12-18 DIAGNOSIS — I10 BENIGN ESSENTIAL HYPERTENSION: ICD-10-CM

## 2019-12-18 DIAGNOSIS — Z23 NEED FOR PNEUMOCOCCAL VACCINATION: ICD-10-CM

## 2019-12-18 DIAGNOSIS — M17.0 PRIMARY OSTEOARTHRITIS OF BOTH KNEES: ICD-10-CM

## 2019-12-18 DIAGNOSIS — R73.03 PREDIABETES: ICD-10-CM

## 2019-12-18 PROCEDURE — 99397 PER PM REEVAL EST PAT 65+ YR: CPT | Mod: 25,S$GLB,, | Performed by: INTERNAL MEDICINE

## 2019-12-18 PROCEDURE — 90670 PNEUMOCOCCAL CONJUGATE VACCINE 13-VALENT LESS THAN 5YO & GREATER THAN: ICD-10-PCS | Mod: S$GLB,,, | Performed by: INTERNAL MEDICINE

## 2019-12-18 PROCEDURE — 99499 RISK ADDL DX/OHS AUDIT: ICD-10-PCS | Mod: S$GLB,,, | Performed by: INTERNAL MEDICINE

## 2019-12-18 PROCEDURE — 99999 PR PBB SHADOW E&M-EST. PATIENT-LVL IV: ICD-10-PCS | Mod: PBBFAC,,, | Performed by: INTERNAL MEDICINE

## 2019-12-18 PROCEDURE — 99499 UNLISTED E&M SERVICE: CPT | Mod: S$GLB,,, | Performed by: INTERNAL MEDICINE

## 2019-12-18 PROCEDURE — 90670 PCV13 VACCINE IM: CPT | Mod: S$GLB,,, | Performed by: INTERNAL MEDICINE

## 2019-12-18 PROCEDURE — 99999 PR PBB SHADOW E&M-EST. PATIENT-LVL IV: CPT | Mod: PBBFAC,,, | Performed by: INTERNAL MEDICINE

## 2019-12-18 PROCEDURE — G0009 PNEUMOCOCCAL CONJUGATE VACCINE 13-VALENT LESS THAN 5YO & GREATER THAN: ICD-10-PCS | Mod: S$GLB,,, | Performed by: INTERNAL MEDICINE

## 2019-12-18 PROCEDURE — 99397 PR PREVENTIVE VISIT,EST,65 & OVER: ICD-10-PCS | Mod: 25,S$GLB,, | Performed by: INTERNAL MEDICINE

## 2019-12-18 PROCEDURE — G0009 ADMIN PNEUMOCOCCAL VACCINE: HCPCS | Mod: S$GLB,,, | Performed by: INTERNAL MEDICINE

## 2019-12-18 NOTE — PROGRESS NOTES
Subjective:     Chief Complaint  Chief Complaint   Patient presents with    well women exam       HPI  Yumiko Boyd is a 64 y.o. female with medical diagnoses as listed in the medical history and problem list that presents for well examination.  Last clinic visit was 11/18/2019.      Patient with no postmenopausal symptoms     A1c 5.7%  She enjoys eating bread    Intermittent knee pain  Acupuncture helped symptoms in the fast       Patient Care Team:  Kurt Limon MD as PCP - General (Internal Medicine)  Opal Wheeler LPN as Licensed Practical Nurse      PAST MEDICAL HISTORY:  Past Medical History:   Diagnosis Date    Hyperlipidemia     Hypertension     Recurrent kidney stones        PAST SURGICAL HISTORY:  Past Surgical History:   Procedure Laterality Date    TUBAL LIGATION  1980s       SOCIAL HISTORY:  Social History     Socioeconomic History    Marital status:      Spouse name: Not on file    Number of children: Not on file    Years of education: Not on file    Highest education level: Not on file   Occupational History    Not on file   Social Needs    Financial resource strain: Not on file    Food insecurity:     Worry: Not on file     Inability: Not on file    Transportation needs:     Medical: Not on file     Non-medical: Not on file   Tobacco Use    Smoking status: Never Smoker    Smokeless tobacco: Never Used   Substance and Sexual Activity    Alcohol use: Yes     Frequency: Monthly or less     Drinks per session: 1 or 2     Binge frequency: Never    Drug use: Never    Sexual activity: Not Currently     Partners: Male     Comment:  since 2000s   Lifestyle    Physical activity:     Days per week: Not on file     Minutes per session: Not on file    Stress: Not on file   Relationships    Social connections:     Talks on phone: Not on file     Gets together: Not on file     Attends Taoist service: Not on file     Active member of club or organization: Not  on file     Attends meetings of clubs or organizations: Not on file     Relationship status: Not on file   Other Topics Concern    Not on file   Social History Narrative    Not on file       FAMILY HISTORY:  Family History   Problem Relation Age of Onset    Hypertension Mother     Stroke Mother     Hyperlipidemia Mother     Colon cancer Father     Heart disease Father     Hyperlipidemia Father     Obesity Sister     Hypertension Sister     Diabetes Mellitus Sister     Hyperlipidemia Brother     Hyperlipidemia Brother     Obesity Sister     Hypertension Sister        ALLERGIES AND MEDICATIONS: updated and reviewed.  Review of patient's allergies indicates:  No Known Allergies  Current Outpatient Medications   Medication Sig Dispense Refill    amLODIPine (NORVASC) 5 MG tablet Take 1 tablet (5 mg total) by mouth once daily. 90 tablet 1    cetirizine (ZYRTEC) 10 MG tablet Take 1 tablet (10 mg total) by mouth once daily. (Patient taking differently: Take 10 mg by mouth as needed. ) 90 tablet 3    lisinopril-hydrochlorothiazide (PRINZIDE,ZESTORETIC) 20-12.5 mg per tablet Take 1 tablet by mouth 2 (two) times daily. (Patient taking differently: Take 1 tablet by mouth once daily. ) 90 tablet 1    meloxicam (MOBIC) 7.5 MG tablet Take 1 tablet (7.5 mg total) by mouth daily as needed for Pain. 30 tablet 2    omeprazole (PRILOSEC) 20 MG capsule Take 1 capsule (20 mg total) by mouth once daily. 90 capsule 1    rosuvastatin (CRESTOR) 20 MG tablet Take 1 tablet (20 mg total) by mouth once daily. 90 tablet 1     Current Facility-Administered Medications   Medication Dose Route Frequency Provider Last Rate Last Dose    triamcinolone acetonide injection 40 mg  40 mg Intra-articular 1 time in Clinic/HOD David Trivedi DO        triamcinolone acetonide injection 40 mg  40 mg Intra-articular 1 time in Clinic/HOD David Trivedi DO             ROS:  Review of Systems   Constitutional: Negative for appetite  "change, chills, fever and unexpected weight change.   Respiratory: Negative for cough and shortness of breath.    Cardiovascular: Negative for chest pain, palpitations and leg swelling.   Gastrointestinal: Negative for abdominal pain, constipation, diarrhea, nausea and vomiting.   Musculoskeletal: Positive for arthralgias (knee pain - bilateral).   Skin: Negative.    Neurological: Negative for dizziness, light-headedness and headaches.   Psychiatric/Behavioral: Negative for dysphoric mood. The patient is not nervous/anxious.        Objective:       Physical Exam  Vitals:    12/18/19 0921   BP: 136/82   BP Location: Left arm   Patient Position: Sitting   BP Method: Medium (Manual)   Pulse: 83   Temp: 98.5 °F (36.9 °C)   TempSrc: Oral   SpO2: 97%   Weight: 95.4 kg (210 lb 5.1 oz)   Height: 5' 6" (1.676 m)    Body mass index is 33.95 kg/m².  Weight: 95.4 kg (210 lb 5.1 oz)   Height: 5' 6" (167.6 cm)   Physical Exam   Constitutional: She appears well-developed. No distress.   HENT:   Head: Normocephalic and atraumatic.   Mouth/Throat: Oropharynx is clear and moist.   Eyes: Pupils are equal, round, and reactive to light. Conjunctivae and EOM are normal.   Neck: Normal range of motion. Neck supple. No thyromegaly present.   Cardiovascular: Normal rate, normal heart sounds and intact distal pulses.   No murmur heard.  Pulmonary/Chest: Effort normal and breath sounds normal.   Abdominal: Soft. She exhibits no distension. There is no tenderness.   Musculoskeletal: She exhibits no edema or deformity.   Lymphadenopathy:     She has no cervical adenopathy.   Neurological: She is alert. No cranial nerve deficit.   Skin: Skin is warm and dry.   Psychiatric: She has a normal mood and affect. Her behavior is normal.   Vitals reviewed.          Assessment:     1. Well woman exam    2. Benign essential hypertension    3. Prediabetes    4. Mixed hyperlipidemia    5. Primary osteoarthritis of both knees    6. Need for pneumococcal " vaccination      Plan:     Yumiko was seen today for well women exam.    Diagnoses and all orders for this visit:    Well woman exam  Discussed healthy diet, regular exercise, necessary labs, age appropriate cancer screening, and routine vaccinations.      Benign essential hypertension  BP controlled presently - reviewed anti-hypertensive regimen - continue current therapy  -     Basic metabolic panel; Future    Prediabetes  Repeat A1c in 6 months  -     Hemoglobin A1c; Future    Mixed hyperlipidemia   Lipids controlled presently - reviewed anti-hyperlipidemic regimen - continue current therapy    Primary osteoarthritis of both knees  Using Mobic sparingly - counseled regarding    Need for pneumococcal vaccination  Risks and benefits of pneumococcal vaccination discussed - administered.  -     Pneumococcal Conjugate Vaccine (13 Valent) (IM)          Health Maintenance       Date Due Completion Date    DEXA SCAN 11/30/1994 ---    Mammogram 11/02/2017 11/2/2015    Pneumococcal Vaccine (65+ Low/Medium Risk) (1 of 2 - PCV13) 11/30/2019 11/29/2016    Shingles Vaccine (2 of 2) 01/13/2020 11/18/2019    Colonoscopy 08/12/2021 8/12/2016    Lipid Panel 11/18/2024 11/18/2019    TETANUS VACCINE 04/11/2029 4/11/2019            Health Maintenance reviewed and addressed as per orders    Follow up in about 6 months (around 6/18/2020) for Hypertension.    The patient expressed understanding and no barriers to adherence were identified.     1. The patient indicates understanding of these issues and agrees with the plan. Brief care plan is updated and reviewed with the patient as applicable.     2. The patient is given an After Visit Summary that lists all medications with directions, allergies, orders placed during this encounter and follow-up instructions.     3. I have reviewed the patient's medical information including past medical, family, and social history sections including the medications and allergies.     4. We discussed  the patient's current medications. I reconciled the patient's medication list and prepared and supplied needed refills.       Kurt Limon MD  Internal Medicine-Pediatrics

## 2019-12-30 ENCOUNTER — HOSPITAL ENCOUNTER (OUTPATIENT)
Dept: RADIOLOGY | Facility: HOSPITAL | Age: 64
Discharge: HOME | End: 2019-12-30
Attending: INTERNAL MEDICINE
Payer: MEDICARE

## 2019-12-30 VITALS — HEIGHT: 66 IN | WEIGHT: 210 LBS | BODY MASS INDEX: 33.75 KG/M2

## 2019-12-30 DIAGNOSIS — Z12.31 ENCOUNTER FOR SCREENING MAMMOGRAM FOR MALIGNANT NEOPLASM OF BREAST: ICD-10-CM

## 2019-12-30 PROCEDURE — 77067 SCR MAMMO BI INCL CAD: CPT | Mod: 26,,, | Performed by: RADIOLOGY

## 2019-12-30 PROCEDURE — 77063 BREAST TOMOSYNTHESIS BI: CPT | Mod: 26,,, | Performed by: RADIOLOGY

## 2019-12-30 PROCEDURE — 77067 SCR MAMMO BI INCL CAD: CPT | Mod: TC,PO

## 2019-12-30 PROCEDURE — 77063 MAMMO DIGITAL SCREENING BILAT WITH TOMOSYNTHESIS_CAD: ICD-10-PCS | Mod: 26,,, | Performed by: RADIOLOGY

## 2019-12-30 PROCEDURE — 77067 MAMMO DIGITAL SCREENING BILAT WITH TOMOSYNTHESIS_CAD: ICD-10-PCS | Mod: 26,,, | Performed by: RADIOLOGY

## 2020-01-20 ENCOUNTER — HOSPITAL ENCOUNTER (OUTPATIENT)
Dept: RADIOLOGY | Facility: HOSPITAL | Age: 65
Discharge: HOME | End: 2020-01-20
Attending: INTERNAL MEDICINE
Payer: MEDICARE

## 2020-01-20 ENCOUNTER — CLINICAL SUPPORT (OUTPATIENT)
Dept: FAMILY MEDICINE | Facility: CLINIC | Age: 65
End: 2020-01-20
Payer: MEDICARE

## 2020-01-20 DIAGNOSIS — Z23 NEED FOR ZOSTER VACCINE: Primary | ICD-10-CM

## 2020-01-20 PROCEDURE — 77080 DXA BONE DENSITY AXIAL: CPT | Mod: 26,,, | Performed by: RADIOLOGY

## 2020-01-20 PROCEDURE — 90471 ZOSTER RECOMBINANT VACCINE: ICD-10-PCS | Mod: S$GLB,,, | Performed by: INTERNAL MEDICINE

## 2020-01-20 PROCEDURE — 99499 UNLISTED E&M SERVICE: CPT | Mod: S$GLB,,, | Performed by: INTERNAL MEDICINE

## 2020-01-20 PROCEDURE — 90750 HZV VACC RECOMBINANT IM: CPT | Mod: S$GLB,,, | Performed by: INTERNAL MEDICINE

## 2020-01-20 PROCEDURE — 77080 DXA BONE DENSITY AXIAL: CPT | Mod: TC

## 2020-01-20 PROCEDURE — 77080 DEXA BONE DENSITY SPINE HIP: ICD-10-PCS | Mod: 26,,, | Performed by: RADIOLOGY

## 2020-01-20 PROCEDURE — 90471 IMMUNIZATION ADMIN: CPT | Mod: S$GLB,,, | Performed by: INTERNAL MEDICINE

## 2020-01-20 PROCEDURE — 99499 NO LOS: ICD-10-PCS | Mod: S$GLB,,, | Performed by: INTERNAL MEDICINE

## 2020-01-20 PROCEDURE — 90750 ZOSTER RECOMBINANT VACCINE: ICD-10-PCS | Mod: S$GLB,,, | Performed by: INTERNAL MEDICINE

## 2020-03-16 ENCOUNTER — TELEPHONE (OUTPATIENT)
Dept: FAMILY MEDICINE | Facility: CLINIC | Age: 65
End: 2020-03-16

## 2020-03-16 NOTE — TELEPHONE ENCOUNTER
----- Message from Jennifer Smith sent at 3/16/2020  2:19 PM CDT -----  Contact: Pt  Pt requesting a New Prescription for Ear Infection    Please call and advise    Phone [phone]

## 2020-03-16 NOTE — TELEPHONE ENCOUNTER
----- Message from Jennifer Smith sent at 3/16/2020  2:19 PM CDT -----  Contact: Pt  Pt requesting a New Prescription for Ear Infection    Please call and advise    Phone [phone]

## 2020-03-25 ENCOUNTER — OFFICE VISIT (OUTPATIENT)
Dept: FAMILY MEDICINE | Facility: CLINIC | Age: 65
End: 2020-03-25
Payer: MEDICARE

## 2020-03-25 VITALS
WEIGHT: 205.69 LBS | TEMPERATURE: 99 F | DIASTOLIC BLOOD PRESSURE: 80 MMHG | HEART RATE: 81 BPM | HEIGHT: 66 IN | SYSTOLIC BLOOD PRESSURE: 138 MMHG | BODY MASS INDEX: 33.06 KG/M2 | OXYGEN SATURATION: 95 %

## 2020-03-25 DIAGNOSIS — B96.89 ACUTE BACTERIAL RHINOSINUSITIS: ICD-10-CM

## 2020-03-25 DIAGNOSIS — H65.01 NON-RECURRENT ACUTE SEROUS OTITIS MEDIA OF RIGHT EAR: Primary | ICD-10-CM

## 2020-03-25 DIAGNOSIS — J01.90 ACUTE BACTERIAL RHINOSINUSITIS: ICD-10-CM

## 2020-03-25 DIAGNOSIS — E78.2 MIXED HYPERLIPIDEMIA: ICD-10-CM

## 2020-03-25 DIAGNOSIS — I10 BENIGN ESSENTIAL HYPERTENSION: ICD-10-CM

## 2020-03-25 PROCEDURE — 3075F PR MOST RECENT SYSTOLIC BLOOD PRESS GE 130-139MM HG: ICD-10-PCS | Mod: CPTII,S$GLB,, | Performed by: INTERNAL MEDICINE

## 2020-03-25 PROCEDURE — 3008F PR BODY MASS INDEX (BMI) DOCUMENTED: ICD-10-PCS | Mod: CPTII,S$GLB,, | Performed by: INTERNAL MEDICINE

## 2020-03-25 PROCEDURE — 99999 PR PBB SHADOW E&M-EST. PATIENT-LVL III: CPT | Mod: PBBFAC,,, | Performed by: INTERNAL MEDICINE

## 2020-03-25 PROCEDURE — 99999 PR PBB SHADOW E&M-EST. PATIENT-LVL III: ICD-10-PCS | Mod: PBBFAC,,, | Performed by: INTERNAL MEDICINE

## 2020-03-25 PROCEDURE — 3079F PR MOST RECENT DIASTOLIC BLOOD PRESSURE 80-89 MM HG: ICD-10-PCS | Mod: CPTII,S$GLB,, | Performed by: INTERNAL MEDICINE

## 2020-03-25 PROCEDURE — 99214 OFFICE O/P EST MOD 30 MIN: CPT | Mod: S$GLB,,, | Performed by: INTERNAL MEDICINE

## 2020-03-25 PROCEDURE — 3079F DIAST BP 80-89 MM HG: CPT | Mod: CPTII,S$GLB,, | Performed by: INTERNAL MEDICINE

## 2020-03-25 PROCEDURE — 99214 PR OFFICE/OUTPT VISIT, EST, LEVL IV, 30-39 MIN: ICD-10-PCS | Mod: S$GLB,,, | Performed by: INTERNAL MEDICINE

## 2020-03-25 PROCEDURE — 3075F SYST BP GE 130 - 139MM HG: CPT | Mod: CPTII,S$GLB,, | Performed by: INTERNAL MEDICINE

## 2020-03-25 PROCEDURE — 1101F PT FALLS ASSESS-DOCD LE1/YR: CPT | Mod: CPTII,S$GLB,, | Performed by: INTERNAL MEDICINE

## 2020-03-25 PROCEDURE — 3008F BODY MASS INDEX DOCD: CPT | Mod: CPTII,S$GLB,, | Performed by: INTERNAL MEDICINE

## 2020-03-25 PROCEDURE — 1101F PR PT FALLS ASSESS DOC 0-1 FALLS W/OUT INJ PAST YR: ICD-10-PCS | Mod: CPTII,S$GLB,, | Performed by: INTERNAL MEDICINE

## 2020-03-25 RX ORDER — AMOXICILLIN AND CLAVULANATE POTASSIUM 875; 125 MG/1; MG/1
1 TABLET, FILM COATED ORAL 2 TIMES DAILY
Qty: 14 TABLET | Refills: 0 | Status: SHIPPED | OUTPATIENT
Start: 2020-03-25 | End: 2020-04-01

## 2020-03-25 NOTE — PROGRESS NOTES
Subjective:     Chief Complaint  Chief Complaint   Patient presents with    Otalgia     patietn c/o sinus problems and ear infection x 2 weeks been taking sudafed     Sinus Problem       HPI  Yumiko Boyd is a 64 y.o. female with medical diagnoses as listed in the medical history and problem list that presents for above complaint.  Last clinic visit was 1/20/2020.      4 weeks of ear pain/otalgia, sinus problem  Using Sudafed - mild relief of symptoms   No fevers/chills/sweats    Patient Care Team:  Kurt Limon MD as PCP - General (Internal Medicine)  Opal Wheeler LPN as Licensed Practical Nurse  Opal Wheeler LPN as Care Coordinator      PAST MEDICAL HISTORY:  Past Medical History:   Diagnosis Date    Hyperlipidemia     Hypertension     Recurrent kidney stones        PAST SURGICAL HISTORY:  Past Surgical History:   Procedure Laterality Date    [facility]  [facility]       SOCIAL HISTORY:  Social History     Socioeconomic History    Marital status:      Spouse name: Not on file    Number of children: Not on file    Years of education: Not on file    Highest education level: Not on file   Occupational History    Not on file   Social Needs    Financial resource strain: Not on file    Food insecurity:     Worry: Not on file     Inability: Not on file    Transportation needs:     Medical: Not on file     Non-medical: Not on file   Tobacco Use    Smoking status: Never Smoker    Smokeless tobacco: Never Used   Substance and Sexual Activity    Alcohol use: Yes     Frequency: Monthly or less     Drinks per session: 1 or 2     Binge frequency: Never    Drug use: Never    Sexual activity: Not Currently     Partners: Male     Comment:  since 2000s   Lifestyle    Physical activity:     Days per week: Not on file     Minutes per session: Not on file    Stress: Not on file   Relationships    Social connections:     Talks on phone: Not on file     Gets together: Not on  file     Attends [employer] service: Not on file     Active member of club or organization: Not on file     Attends meetings of clubs or organizations: Not on file     Relationship status: Not on file   Other Topics Concern    Not on file   Social History Narrative    Not on file       FAMILY HISTORY:  Family History   Problem Relation Age of Onset    Hypertension Mother     Stroke Mother     Hyperlipidemia Mother     Colon cancer Father     Heart disease Father     Hyperlipidemia Father     Obesity Sister     Hypertension Sister     Diabetes Mellitus Sister     Hyperlipidemia Brother     Hyperlipidemia Brother     Obesity Sister     Hypertension Sister     Ovarian cancer Maternal Aunt        ALLERGIES AND MEDICATIONS: updated and reviewed.  Review of patient's allergies indicates:  No Known Allergies  Current Outpatient Medications   Medication Sig Dispense Refill    amLODIPine (NORVASC) 5 MG tablet Take 1 tablet (5 mg total) by mouth once daily. 90 tablet 1    cetirizine (ZYRTEC) 10 MG tablet Take 1 tablet (10 mg total) by mouth once daily. (Patient taking differently: Take 10 mg by mouth as needed. ) 90 tablet 3    lisinopril-hydrochlorothiazide (PRINZIDE,ZESTORETIC) 20-12.5 mg per tablet Take 1 tablet by mouth 2 (two) times daily. (Patient taking differently: Take 1 tablet by mouth once daily. ) 90 tablet 1    omeprazole (PRILOSEC) 20 MG capsule Take 1 capsule (20 mg total) by mouth once daily. 90 capsule 1    rosuvastatin (CRESTOR) 20 MG tablet Take 1 tablet (20 mg total) by mouth once daily. 90 tablet 1    amoxicillin-clavulanate 875-125mg (AUGMENTIN) 875-125 mg per tablet Take 1 tablet by mouth 2 (two) times daily. for 7 days 14 tablet 0    meloxicam (MOBIC) 7.5 MG tablet Take 1 tablet (7.5 mg total) by mouth daily as needed for Pain. (Patient not taking: Reported on 3/25/2020) 30 tablet 2     Current Facility-Administered Medications   Medication Dose Route Frequency Provider Last Rate  "Last Dose    triamcinolone acetonide injection 40 mg  40 mg Intra-articular 1 time in Clinic/HOD David Trivedi DO        triamcinolone acetonide injection 40 mg  40 mg Intra-articular 1 time in Clinic/HOD David Trivedi DO             ROS:  Review of Systems   Constitutional: Negative for chills, diaphoresis and fever.   HENT: Positive for congestion, ear pain (both ears), postnasal drip and rhinorrhea. Negative for sinus pressure and sinus pain.    Respiratory: Negative for cough and shortness of breath.    Neurological: Positive for dizziness.       Objective:       Physical Exam  Vitals:    03/25/20 1336   BP: 138/80   BP Location: Left arm   Patient Position: Sitting   BP Method: Medium (Manual)   Pulse: 81   Temp: 98.5 °F (36.9 °C)   TempSrc: Oral   SpO2: 95%   Weight: 93.3 kg (205 lb 11 oz)   Height: 5' 6" (1.676 m)    Body mass index is 33.2 kg/m².  Weight: 93.3 kg (205 lb 11 oz)   Height: 5' 6" (167.6 cm)   Physical Exam   Constitutional: She appears well-developed. No distress.   HENT:   Head: Normocephalic and atraumatic.   Right Ear: Tympanic membrane is erythematous and bulging.   Left Ear: Tympanic membrane is erythematous. Tympanic membrane is not bulging.   Nose: Mucosal edema and rhinorrhea present.   Mouth/Throat: Oropharynx is clear and moist.   Eyes: Pupils are equal, round, and reactive to light. Conjunctivae and EOM are normal.   Neck: Normal range of motion. Neck supple. No thyromegaly present.   Cardiovascular: Normal rate, normal heart sounds and intact distal pulses.   No murmur heard.  Pulmonary/Chest: Effort normal and breath sounds normal.   Musculoskeletal: She exhibits no edema or deformity.   Lymphadenopathy:     She has no cervical adenopathy.   Neurological: She is alert. No cranial nerve deficit.   Skin: Skin is warm and dry.   Psychiatric: She has a normal mood and affect. Her behavior is normal.   Vitals reviewed.          Assessment:     1. Non-recurrent acute serous " otitis media of right ear    2. Acute bacterial rhinosinusitis    3. Benign essential hypertension      Plan:     Yumiko was seen today for otalgia and sinus problem.    Diagnoses and all orders for this visit:    Non-recurrent acute serous otitis media of right ear  Acute bacterial rhinosinusitis  Discussed symptomatology of AOM/acute bacterial rhinosinusitis, including risk factors and proposed benefit, side effects/risks of antibiotic therapy   -     amoxicillin-clavulanate 875-125mg (AUGMENTIN) 875-125 mg per tablet; Take 1 tablet by mouth 2 (two) times daily. for 7 days    Benign essential hypertension  BP controlled presently - reviewed anti-hypertensive regimen - continue current therapy    Mixed hyperlipidemia  On Lipitor     Health Maintenance       Date Due Completion Date    HIV Screening 11/30/1970 ---    Colonoscopy 08/12/2021 8/12/2016    Pneumococcal Vaccine (65+ Low/Medium Risk) (2 of 2 - PPSV23) 11/29/2021 12/18/2019    Mammogram 12/30/2021 12/30/2019    DEXA SCAN 01/20/2023 1/20/2020    Lipid Panel 11/18/2024 11/18/2019    TETANUS VACCINE 04/11/2029 4/11/2019            Health Maintenance reviewed and addressed as per orders    No follow-ups on file.    The patient expressed understanding and no barriers to adherence were identified.     1. The patient indicates understanding of these issues and agrees with the plan. Brief care plan is updated and reviewed with the patient as applicable.     2. The patient is given an After Visit Summary that lists all medications with directions, allergies, orders placed during this encounter and follow-up instructions.     3. I have reviewed the patient's medical information including past medical, family, and social history sections including the medications and allergies.     4. We discussed the patient's current medications. I reconciled the patient's medication list and prepared and supplied needed refills.       Kurt Limon MD  Internal  Medicine-Pediatrics

## 2020-03-25 NOTE — PATIENT INSTRUCTIONS
Middle Ear Infection (Adult)  You have an infection of the middle ear, the space behind the eardrum. This is also called acute otitis media (AOM). Sometimes it is caused by the common cold. This is because congestion can block the internal passage (eustachian tube) that drains fluid from the middle ear. When the middle ear fills with fluid, bacteria can grow there and cause an infection. Oral antibiotics are used to treat this illness, not ear drops. Symptoms usually start to improve within 1 to 2 days of treatment.    Home care  The following are general care guidelines:  · Finish all of the antibiotic medicine given, even though you may feel better after the first few days.  · You may use over-the-counter medicine, such as acetaminophen or ibuprofen, to control pain and fever, unless something else was prescribed. If you have chronic liver or kidney disease or have ever had a stomach ulcer or gastrointestinal bleeding, talk with your healthcare provider before using these medicines. Do not give aspirin to anyone under 18 years of age who has a fever. It may cause severe illness or death.  Follow-up care  Follow up with your healthcare provider, or as advised, in 2 weeks if all symptoms have not gotten better, or if hearing doesn't go back to normal within 1 month.  When to seek medical advice  Call your healthcare provider right away if any of these occur:  · Ear pain gets worse or does not improve after 3 days of treatment  · Unusual drowsiness or confusion  · Neck pain, stiff neck, or headache  · Fluid or blood draining from the ear canal  · Fever of 100.4°F (38°C) or as advised   · Seizure  Date Last Reviewed: 6/1/2016  © 3422-7485 [employer]. [address] [place] [address], [place] [address]. All rights reserved. This information is not intended as a substitute for professional medical care. Always follow your healthcare professional's instructions.

## 2020-04-27 ENCOUNTER — TELEPHONE (OUTPATIENT)
Dept: FAMILY MEDICINE | Facility: CLINIC | Age: 65
End: 2020-04-27

## 2020-04-27 DIAGNOSIS — I10 BENIGN ESSENTIAL HYPERTENSION: ICD-10-CM

## 2020-04-27 RX ORDER — LISINOPRIL AND HYDROCHLOROTHIAZIDE 12.5; 2 MG/1; MG/1
1 TABLET ORAL DAILY
Qty: 90 TABLET | Refills: 1 | Status: SHIPPED | OUTPATIENT
Start: 2020-04-27 | End: 2020-07-22 | Stop reason: SDUPTHER

## 2020-04-27 NOTE — TELEPHONE ENCOUNTER
----- Message from Loy Montoya sent at 4/27/2020 11:08 AM CDT -----  Contact: Patient  Patient called in and wanted to speak with the office regarding her prescription. She would like a call back from the office and can be reached at    [phone]

## 2020-05-15 ENCOUNTER — OFFICE VISIT (OUTPATIENT)
Dept: FAMILY MEDICINE | Facility: CLINIC | Age: 65
End: 2020-05-15
Payer: MEDICARE

## 2020-05-15 DIAGNOSIS — R09.82 POSTNASAL DRIP: Primary | ICD-10-CM

## 2020-05-15 DIAGNOSIS — J30.1 SEASONAL ALLERGIC RHINITIS DUE TO POLLEN: ICD-10-CM

## 2020-05-15 PROCEDURE — 99441 PR PHYSICIAN TELEPHONE EVALUATION 5-10 MIN: CPT | Mod: 95,,, | Performed by: INTERNAL MEDICINE

## 2020-05-15 PROCEDURE — 99441 PR PHYSICIAN TELEPHONE EVALUATION 5-10 MIN: ICD-10-PCS | Mod: 95,,, | Performed by: INTERNAL MEDICINE

## 2020-05-15 RX ORDER — LORATADINE 10 MG/1
10 TABLET ORAL DAILY
Qty: 30 TABLET | Refills: 0 | COMMUNITY
Start: 2020-05-15 | End: 2021-04-13 | Stop reason: SDUPTHER

## 2020-05-15 RX ORDER — IPRATROPIUM BROMIDE 42 UG/1
2 SPRAY, METERED NASAL 4 TIMES DAILY PRN
Qty: 30 ML | Refills: 0 | Status: SHIPPED | OUTPATIENT
Start: 2020-05-15 | End: 2020-06-08

## 2020-05-15 RX ORDER — MONTELUKAST SODIUM 10 MG/1
10 TABLET ORAL NIGHTLY
Qty: 30 TABLET | Refills: 0 | Status: SHIPPED | OUTPATIENT
Start: 2020-05-15 | End: 2020-06-07

## 2020-05-15 NOTE — PROGRESS NOTES
Established Patient - Audio Only Telehealth Visit     The patient location is: [place] / [place]   The chief complaint leading to consultation is: ear/sinus problems  Visit type: Virtual visit with audio only (telephone)  Total time spent with patient: 6 minutes     The reason for the audio only service rather than synchronous audio and video virtual visit was related to technical difficulties or patient preference/necessity.     Each patient to whom I provide medical services by telemedicine is:  (1) informed of the relationship between the physician and patient and the respective role of any other health care provider with respect to management of the patient; and (2) notified that they may decline to receive medical services by telemedicine and may withdraw from such care at any time. Patient verbally consented to receive this service via voice-only telephone call.     HPI: patient notes recurrence of ear problems she first noted 2 months prior. She is getting dizzy getting up out a chair and walking to the bathroom. No fevers. Having mucous in the back of her throat. Runny nose and ear discomfort of both ears is notable. No congestion notable. She was given an antibiotic in March (Augmentin) with resolution of symptoms at that time. Took diphenhydramine with some improvement in symptoms     Assessment and plan:  Patient with HTN presenting with ear discomfort and sinus drip/rhinorrhea - likely consistent with allergic rhinitis. Therefore, will trial antihistamine therapy with LTRA presently and utilize nasal ipratropium to post-nasal drip as needed. Counseled to contact us again if symptoms worsen/do not improve.    Kurt Limon MD  Internal Medicine-Pediatrics     This service was not originating from a related E/M service provided within the previous 7 days nor will  to an E/M service or procedure within the next 24 hours or my soonest available appointment.  Prevailing standard of care was able to  be met in this audio-only visit.

## 2020-06-15 ENCOUNTER — LAB VISIT (OUTPATIENT)
Dept: LAB | Facility: HOSPITAL | Age: 65
End: 2020-06-15
Attending: INTERNAL MEDICINE
Payer: MEDICARE

## 2020-06-15 DIAGNOSIS — I10 BENIGN ESSENTIAL HYPERTENSION: ICD-10-CM

## 2020-06-15 DIAGNOSIS — R73.03 PREDIABETES: ICD-10-CM

## 2020-06-15 LAB
ANION GAP SERPL CALC-SCNC: 8 MMOL/L (ref 8–16)
BUN SERPL-MCNC: 17 MG/DL (ref 8–23)
CALCIUM SERPL-MCNC: 9.2 MG/DL (ref 8.7–10.5)
CHLORIDE SERPL-SCNC: 105 MMOL/L (ref 95–110)
CO2 SERPL-SCNC: 27 MMOL/L (ref 23–29)
CREAT SERPL-MCNC: 0.8 MG/DL (ref 0.5–1.4)
EST. GFR  (AFRICAN AMERICAN): >60 ML/MIN/1.73 M^2
EST. GFR  (NON AFRICAN AMERICAN): >60 ML/MIN/1.73 M^2
ESTIMATED AVG GLUCOSE: 114 MG/DL (ref 68–131)
GLUCOSE SERPL-MCNC: 124 MG/DL (ref 70–110)
HBA1C MFR BLD HPLC: 5.6 % (ref 4–5.6)
POTASSIUM SERPL-SCNC: 4.1 MMOL/L (ref 3.5–5.1)
SODIUM SERPL-SCNC: 140 MMOL/L (ref 136–145)

## 2020-06-15 PROCEDURE — 80048 BASIC METABOLIC PNL TOTAL CA: CPT

## 2020-06-15 PROCEDURE — 83036 HEMOGLOBIN GLYCOSYLATED A1C: CPT

## 2020-06-15 PROCEDURE — 36415 COLL VENOUS BLD VENIPUNCTURE: CPT | Mod: PO

## 2020-07-22 DIAGNOSIS — I10 BENIGN ESSENTIAL HYPERTENSION: ICD-10-CM

## 2020-07-22 DIAGNOSIS — K21.00 GASTROESOPHAGEAL REFLUX DISEASE WITH ESOPHAGITIS: ICD-10-CM

## 2020-07-22 DIAGNOSIS — J30.1 SEASONAL ALLERGIC RHINITIS DUE TO POLLEN: ICD-10-CM

## 2020-07-22 DIAGNOSIS — E78.2 MIXED HYPERLIPIDEMIA: ICD-10-CM

## 2020-07-22 RX ORDER — OMEPRAZOLE 20 MG/1
20 CAPSULE, DELAYED RELEASE ORAL DAILY
Qty: 90 CAPSULE | Refills: 1 | Status: SHIPPED | OUTPATIENT
Start: 2020-07-22 | End: 2021-04-13 | Stop reason: SDUPTHER

## 2020-07-22 RX ORDER — MONTELUKAST SODIUM 10 MG/1
10 TABLET ORAL NIGHTLY
Qty: 30 TABLET | Refills: 0 | Status: SHIPPED | OUTPATIENT
Start: 2020-07-22 | End: 2020-07-24 | Stop reason: SDUPTHER

## 2020-07-22 RX ORDER — ROSUVASTATIN CALCIUM 20 MG/1
20 TABLET, COATED ORAL DAILY
Qty: 90 TABLET | Refills: 1 | Status: SHIPPED | OUTPATIENT
Start: 2020-07-22 | End: 2021-04-13 | Stop reason: SDUPTHER

## 2020-07-22 RX ORDER — LISINOPRIL AND HYDROCHLOROTHIAZIDE 12.5; 2 MG/1; MG/1
1 TABLET ORAL DAILY
Qty: 90 TABLET | Refills: 1 | Status: SHIPPED | OUTPATIENT
Start: 2020-07-22 | End: 2021-04-13 | Stop reason: SDUPTHER

## 2020-07-22 RX ORDER — AMLODIPINE BESYLATE 5 MG/1
5 TABLET ORAL DAILY
Qty: 90 TABLET | Refills: 1 | Status: SHIPPED | OUTPATIENT
Start: 2020-07-22 | End: 2020-07-24 | Stop reason: SDUPTHER

## 2020-07-22 NOTE — TELEPHONE ENCOUNTER
----- Message from Gladys Knowles sent at 7/22/2020  2:22 PM CDT -----  Regarding: refills  Contact: pt  Pt called to request refills on amLODIPine (NORVASC) 5 MG tablet, lisinopriL-hydrochlorothiazide (PRINZIDE,ZESTORETIC) 20-12.5 mg per tablet, rosuvastatin (CRESTOR) 20 MG tablet, omeprazole (PRILOSEC) 20 MG capsule and montelukast (SINGULAIR) 10 mg tablet. Please send to pharm on file. Pt stated she'll be out in 30 days and requested 90 day supply.

## 2020-07-24 ENCOUNTER — TELEPHONE (OUTPATIENT)
Dept: FAMILY MEDICINE | Facility: CLINIC | Age: 65
End: 2020-07-24

## 2020-07-24 ENCOUNTER — OFFICE VISIT (OUTPATIENT)
Dept: FAMILY MEDICINE | Facility: CLINIC | Age: 65
End: 2020-07-24
Payer: MEDICARE

## 2020-07-24 DIAGNOSIS — I10 BENIGN ESSENTIAL HYPERTENSION: ICD-10-CM

## 2020-07-24 DIAGNOSIS — R09.82 POSTNASAL DRIP: ICD-10-CM

## 2020-07-24 DIAGNOSIS — J30.9 ALLERGIC SINUSITIS: Primary | ICD-10-CM

## 2020-07-24 DIAGNOSIS — J30.1 SEASONAL ALLERGIC RHINITIS DUE TO POLLEN: ICD-10-CM

## 2020-07-24 PROCEDURE — 99213 PR OFFICE/OUTPT VISIT, EST, LEVL III, 20-29 MIN: ICD-10-PCS | Mod: 95,,, | Performed by: INTERNAL MEDICINE

## 2020-07-24 PROCEDURE — 99213 OFFICE O/P EST LOW 20 MIN: CPT | Mod: 95,,, | Performed by: INTERNAL MEDICINE

## 2020-07-24 PROCEDURE — 1101F PT FALLS ASSESS-DOCD LE1/YR: CPT | Mod: CPTII,95,, | Performed by: INTERNAL MEDICINE

## 2020-07-24 PROCEDURE — 1101F PR PT FALLS ASSESS DOC 0-1 FALLS W/OUT INJ PAST YR: ICD-10-PCS | Mod: CPTII,95,, | Performed by: INTERNAL MEDICINE

## 2020-07-24 RX ORDER — MONTELUKAST SODIUM 10 MG/1
10 TABLET ORAL NIGHTLY
Qty: 90 TABLET | Refills: 1 | Status: SHIPPED | OUTPATIENT
Start: 2020-07-24 | End: 2021-01-20

## 2020-07-24 RX ORDER — AMLODIPINE BESYLATE 5 MG/1
5 TABLET ORAL DAILY
Qty: 90 TABLET | Refills: 1 | Status: SHIPPED | OUTPATIENT
Start: 2020-07-24 | End: 2021-04-13 | Stop reason: SDUPTHER

## 2020-07-24 RX ORDER — PREDNISONE 20 MG/1
40 TABLET ORAL DAILY
Qty: 10 TABLET | Refills: 0 | Status: SHIPPED | OUTPATIENT
Start: 2020-07-24 | End: 2020-07-29

## 2020-07-24 NOTE — TELEPHONE ENCOUNTER
----- Message from Susan Goldman sent at 7/24/2020  7:09 AM CDT -----  Type: Patient Call Back patient has appt at 7 this morning     Who called: Self     What is the request in detail:patient states her computer won't work and she need her appt. To be an Audio appt. Please call     Can the clinic reply by [facility]? No     Would the patient rather a call back or a response via [facility]?  Call     Best call back number:[phone] (home)

## 2020-07-24 NOTE — PROGRESS NOTES
Subjective:       Chief Complaint  Chief Complaint   Patient presents with    Sinus Problem       HPI  Yumiko Boyd is a 64 y.o. female with multiple medical diagnoses as listed in the medical history and problem list that presents for above complaint(s).    The patient location is: [place]   The chief complaint leading to consultation is: [place]     Visit type: audiovisual    Face to Face time with patient: 8 minutes  12 minutes of total time spent on the encounter, which includes face to face time and non-face to face time preparing to see the patient (eg, review of tests), Obtaining and/or reviewing separately obtained history, Documenting clinical information in the electronic or other health record, Independently interpreting results (not separately reported) and communicating results to the patient/family/caregiver, or Care coordination (not separately reported).     Each patient to whom he or she provides medical services by telemedicine is:  (1) informed of the relationship between the physician and patient and the respective role of any other health care provider with respect to management of the patient; and (2) notified that he or she may decline to receive medical services by telemedicine and may withdraw from such care at any time.    Otalgia   There is pain in the right ear. This is a chronic problem. The current episode started yesterday. The problem occurs constantly. The problem has been waxing and waning. There has been no fever. The fever has been present for less than 1 day. The pain is at a severity of 8/10. The pain is moderate. Associated symptoms include drainage, headaches, rhinorrhea and a sore throat. Pertinent negatives include no abdominal pain, coughing, diarrhea, ear discharge, hearing loss, neck pain, rash or vomiting. She has tried NSAIDs for the symptoms. The treatment provided moderate relief. Her past medical history is significant for a chronic ear infection. There  is no history of hearing loss or a tympanostomy tube.     Describes post-nasal drip  Has been cleaning her house and does have a cat  Previously taking Claritin  Just taking Singulair      Patient Care Team:  Kurt Limon MD as PCP - General (Internal Medicine)  Opal Wheeler LPN as Licensed Practical Nurse  Opal Wheeler LPN as Care Coordinator      PAST MEDICAL HISTORY:  Past Medical History:   Diagnosis Date    Hyperlipidemia     Hypertension     Recurrent kidney stones        PAST SURGICAL HISTORY:  Past Surgical History:   Procedure Laterality Date    TUBAL LIGATION  1980s       SOCIAL HISTORY:  Social History     Socioeconomic History    Marital status:      Spouse name: Not on file    Number of children: Not on file    Years of education: Not on file    Highest education level: Not on file   Occupational History    Not on file   Social Needs    Financial resource strain: Not on file    Food insecurity     Worry: Not on file     Inability: Not on file    Transportation needs     Medical: Not on file     Non-medical: Not on file   Tobacco Use    Smoking status: Never Smoker    Smokeless tobacco: Never Used   Substance and Sexual Activity    Alcohol use: Yes     Frequency: Monthly or less     Drinks per session: 1 or 2     Binge frequency: Never    Drug use: Never    Sexual activity: Not Currently     Partners: Male     Comment:  since 2000s   Lifestyle    Physical activity     Days per week: Not on file     Minutes per session: Not on file    Stress: Not on file   Relationships    Social connections     Talks on phone: Not on file     Gets together: Not on file     Attends [employer] service: Not on file     Active member of club or organization: Not on file     Attends meetings of clubs or organizations: Not on file     Relationship status: Not on file   Other Topics Concern    Not on file   Social History Narrative    Not on file       FAMILY  HISTORY:  Family History   Problem Relation Age of Onset    Hypertension Mother     Stroke Mother     Hyperlipidemia Mother     Colon cancer Father     Heart disease Father     Hyperlipidemia Father     Obesity Sister     Hypertension Sister     Diabetes Mellitus Sister     Hyperlipidemia Brother     Hyperlipidemia Brother     Obesity Sister     Hypertension Sister     Ovarian cancer Maternal Aunt        ALLERGIES AND MEDICATIONS: updated and reviewed.  Review of patient's allergies indicates:  No Known Allergies  Current Outpatient Medications   Medication Sig Dispense Refill    amLODIPine (NORVASC) 5 MG tablet Take 1 tablet (5 mg total) by mouth once daily. 90 tablet 1    lisinopriL-hydrochlorothiazide (PRINZIDE,ZESTORETIC) 20-12.5 mg per tablet Take 1 tablet by mouth once daily. 90 tablet 1    ipratropium (ATROVENT) 42 mcg (0.06 %) nasal spray INSTILL 2 SPRAYS INTO EACH NOSTRIL 4 TIMES DAILY AS NEEDED FOR RHINITIS, NASAL CONGESTION AND DRIP 45 mL 1    loratadine (CLARITIN) 10 mg tablet Take 1 tablet (10 mg total) by mouth once daily. 30 tablet 0    montelukast (SINGULAIR) 10 mg tablet Take 1 tablet (10 mg total) by mouth every evening. 90 tablet 1    omeprazole (PRILOSEC) 20 MG capsule Take 1 capsule (20 mg total) by mouth once daily. 90 capsule 1    predniSONE (DELTASONE) 20 MG tablet Take 2 tablets (40 mg total) by mouth once daily. for 5 days 10 tablet 0    rosuvastatin (CRESTOR) 20 MG tablet Take 1 tablet (20 mg total) by mouth once daily. 90 tablet 1     Current Facility-Administered Medications   Medication Dose Route Frequency Provider Last Rate Last Dose    triamcinolone acetonide injection 40 mg  40 mg Intra-articular 1 time in Clinic/HOD David Trivedi DO        triamcinolone acetonide injection 40 mg  40 mg Intra-articular 1 time in Clinic/HOD DO MARTHA Zelaya  Review of Systems   HENT: Positive for ear pain, rhinorrhea and sore throat. Negative for ear  discharge and hearing loss.    Respiratory: Negative for cough.    Gastrointestinal: Negative for abdominal pain, diarrhea and vomiting.   Musculoskeletal: Negative for neck pain.   Skin: Negative for rash.   Neurological: Positive for headaches.         Objective:       Physical Exam  There were no vitals filed for this visit. There is no height or weight on file to calculate BMI.          Physical Exam  Constitutional:       General: She is not in acute distress.     Appearance: She is well-developed. She is not diaphoretic.   HENT:      Head: Normocephalic and atraumatic.   Pulmonary:      Effort: Pulmonary effort is normal.   Neurological:      Mental Status: She is alert.   Psychiatric:         Behavior: Behavior normal.             Assessment:     1. Allergic sinusitis    2. Postnasal drip    3. Seasonal allergic rhinitis due to pollen    4. Benign essential hypertension      Plan:     Diagnoses and all orders for this visit:    Allergic sinusitis  Postnasal drip  Seasonal allergic rhinitis due to pollen  Discussed symptoms of acute allergic rhinitis /sinusitis and reviewed treatment modalities, including antihistamines, nasal steroids and advantages/limitations of OTC products (i.e., OTC decongestants, sinus rinse kits). Therapy as noted/per orders.   -     predniSONE (DELTASONE) 20 MG tablet; Take 2 tablets (40 mg total) by mouth once daily. for 5 days  -     montelukast (SINGULAIR) 10 mg tablet; Take 1 tablet (10 mg total) by mouth every evening.    Benign essential hypertension  Sent medication refill(s) to patient's preferred pharmacy on file.  -     amLODIPine (NORVASC) 5 MG tablet; Take 1 tablet (5 mg total) by mouth once daily.          Health Maintenance       Date Due Completion Date    HIV Screening 11/30/1970 ---    Influenza Vaccine (1) 09/01/2020 11/18/2019    Colorectal Cancer Screening 08/12/2021 8/12/2016    Pneumococcal Vaccine (65+ Low/Medium Risk) (2 of 2 - PPSV23) 11/29/2021 12/18/2019     Mammogram 12/30/2021 12/30/2019    DEXA SCAN 01/20/2023 1/20/2020    Lipid Panel 11/18/2024 11/18/2019    TETANUS VACCINE 04/11/2029 4/11/2019          Follow up in 2 months (on 9/23/2020) for chronic medical problems.    The patient expressed understanding and no barriers to adherence were identified.     1. The patient indicates understanding of these issues and agrees with the plan. Brief care plan is updated and reviewed with the patient as applicable.     2. The patient is given an After Visit Summary that lists all medications with directions, allergies, orders placed during this encounter and follow-up instructions.     3. I have reviewed the patient's medical information including past medical, family, and social history sections including the medications and allergies.     4. We discussed the patient's current medications. I reconciled the patient's medication list and prepared and supplied needed refills.       Kurt Limon MD    Internal Medicine-Pediatrics

## 2020-09-23 ENCOUNTER — OFFICE VISIT (OUTPATIENT)
Dept: FAMILY MEDICINE | Facility: CLINIC | Age: 65
End: 2020-09-23
Payer: MEDICARE

## 2020-09-23 VITALS
OXYGEN SATURATION: 96 % | BODY MASS INDEX: 34.35 KG/M2 | SYSTOLIC BLOOD PRESSURE: 128 MMHG | HEIGHT: 66 IN | HEART RATE: 97 BPM | TEMPERATURE: 98 F | WEIGHT: 213.75 LBS | DIASTOLIC BLOOD PRESSURE: 72 MMHG

## 2020-09-23 DIAGNOSIS — M17.12 PRIMARY OSTEOARTHRITIS OF LEFT KNEE: ICD-10-CM

## 2020-09-23 DIAGNOSIS — R73.03 PREDIABETES: ICD-10-CM

## 2020-09-23 DIAGNOSIS — E78.2 MIXED HYPERLIPIDEMIA: ICD-10-CM

## 2020-09-23 DIAGNOSIS — Z23 NEED FOR INFLUENZA VACCINATION: ICD-10-CM

## 2020-09-23 DIAGNOSIS — E66.09 CLASS 1 OBESITY DUE TO EXCESS CALORIES WITH SERIOUS COMORBIDITY AND BODY MASS INDEX (BMI) OF 33.0 TO 33.9 IN ADULT: ICD-10-CM

## 2020-09-23 DIAGNOSIS — I10 BENIGN ESSENTIAL HYPERTENSION: Primary | ICD-10-CM

## 2020-09-23 DIAGNOSIS — J30.1 SEASONAL ALLERGIC RHINITIS DUE TO POLLEN: ICD-10-CM

## 2020-09-23 PROCEDURE — 3074F SYST BP LT 130 MM HG: CPT | Mod: CPTII,S$GLB,, | Performed by: INTERNAL MEDICINE

## 2020-09-23 PROCEDURE — 3074F PR MOST RECENT SYSTOLIC BLOOD PRESSURE < 130 MM HG: ICD-10-PCS | Mod: CPTII,S$GLB,, | Performed by: INTERNAL MEDICINE

## 2020-09-23 PROCEDURE — 99214 PR OFFICE/OUTPT VISIT, EST, LEVL IV, 30-39 MIN: ICD-10-PCS | Mod: S$GLB,,, | Performed by: INTERNAL MEDICINE

## 2020-09-23 PROCEDURE — 99214 OFFICE O/P EST MOD 30 MIN: CPT | Mod: S$GLB,,, | Performed by: INTERNAL MEDICINE

## 2020-09-23 PROCEDURE — 3078F PR MOST RECENT DIASTOLIC BLOOD PRESSURE < 80 MM HG: ICD-10-PCS | Mod: CPTII,S$GLB,, | Performed by: INTERNAL MEDICINE

## 2020-09-23 PROCEDURE — 1101F PR PT FALLS ASSESS DOC 0-1 FALLS W/OUT INJ PAST YR: ICD-10-PCS | Mod: CPTII,S$GLB,, | Performed by: INTERNAL MEDICINE

## 2020-09-23 PROCEDURE — 99999 PR PBB SHADOW E&M-EST. PATIENT-LVL III: ICD-10-PCS | Mod: PBBFAC,,, | Performed by: INTERNAL MEDICINE

## 2020-09-23 PROCEDURE — 3008F PR BODY MASS INDEX (BMI) DOCUMENTED: ICD-10-PCS | Mod: CPTII,S$GLB,, | Performed by: INTERNAL MEDICINE

## 2020-09-23 PROCEDURE — 99999 PR PBB SHADOW E&M-EST. PATIENT-LVL III: CPT | Mod: PBBFAC,,, | Performed by: INTERNAL MEDICINE

## 2020-09-23 PROCEDURE — 3008F BODY MASS INDEX DOCD: CPT | Mod: CPTII,S$GLB,, | Performed by: INTERNAL MEDICINE

## 2020-09-23 PROCEDURE — 1101F PT FALLS ASSESS-DOCD LE1/YR: CPT | Mod: CPTII,S$GLB,, | Performed by: INTERNAL MEDICINE

## 2020-09-23 PROCEDURE — 3078F DIAST BP <80 MM HG: CPT | Mod: CPTII,S$GLB,, | Performed by: INTERNAL MEDICINE

## 2020-09-23 NOTE — PROGRESS NOTES
Subjective:     Chief Complaint  Chief Complaint   Patient presents with    Follow-up    Hypertension       HPI  Yumiko Boyd is a 64 y.o. female with medical diagnoses as listed in the medical history and problem list that presents for above complaint(s).    HTN  Taking medications as prescribed    AR   Utilizing nasal steroid occasionally - once a week on average   Effective  Singulair every morning (forgets to take at night)  No drownsiness  Some occasional ear discomfort    OA  Today with left knee pain - mild  Does plan on doing acupuncture - has worked in the past and symptoms improved for approximately  year    Patient Care Team:  Kurt Limon MD as PCP - General (Internal Medicine)  Opal Wheeler LPN as Licensed Practical Nurse  Opal Wheeler LPN as Care Coordinator      PAST MEDICAL HISTORY:  Past Medical History:   Diagnosis Date    Hyperlipidemia     Hypertension     Recurrent kidney stones        PAST SURGICAL HISTORY:  Past Surgical History:   Procedure Laterality Date    TUBAL LIGATION  1980s       SOCIAL HISTORY:  Social History     Socioeconomic History    Marital status:      Spouse name: Not on file    Number of children: Not on file    Years of education: Not on file    Highest education level: Not on file   Occupational History    Not on file   Social Needs    Financial resource strain: Not on file    Food insecurity     Worry: Not on file     Inability: Not on file    Transportation needs     Medical: Not on file     Non-medical: Not on file   Tobacco Use    Smoking status: Never Smoker    Smokeless tobacco: Never Used   Substance and Sexual Activity    Alcohol use: Yes     Frequency: Monthly or less     Drinks per session: 1 or 2     Binge frequency: Never    Drug use: Never    Sexual activity: Not Currently     Partners: Male     Comment:  since 2000s   Lifestyle    Physical activity     Days per week: Not on file     Minutes per  session: Not on file    Stress: Not on file   Relationships    Social connections     Talks on phone: Not on file     Gets together: Not on file     Attends Buddhist service: Not on file     Active member of club or organization: Not on file     Attends meetings of clubs or organizations: Not on file     Relationship status: Not on file   Other Topics Concern    Not on file   Social History Narrative    Not on file       FAMILY HISTORY:  Family History   Problem Relation Age of Onset    Hypertension Mother     Stroke Mother     Hyperlipidemia Mother     Colon cancer Father     Heart disease Father     Hyperlipidemia Father     Obesity Sister     Hypertension Sister     Diabetes Mellitus Sister     Hyperlipidemia Brother     Hyperlipidemia Brother     Obesity Sister     Hypertension Sister     Ovarian cancer Maternal Aunt        ALLERGIES AND MEDICATIONS: updated and reviewed.  Review of patient's allergies indicates:  No Known Allergies  Current Outpatient Medications   Medication Sig Dispense Refill    amLODIPine (NORVASC) 5 MG tablet Take 1 tablet (5 mg total) by mouth once daily. 90 tablet 1    ipratropium (ATROVENT) 42 mcg (0.06 %) nasal spray INSTILL 2 SPRAYS INTO EACH NOSTRIL 4 TIMES DAILY AS NEEDED FOR RHINITIS, NASAL CONGESTION AND DRIP 45 mL 1    lisinopriL-hydrochlorothiazide (PRINZIDE,ZESTORETIC) 20-12.5 mg per tablet Take 1 tablet by mouth once daily. 90 tablet 1    loratadine (CLARITIN) 10 mg tablet Take 1 tablet (10 mg total) by mouth once daily. 30 tablet 0    montelukast (SINGULAIR) 10 mg tablet Take 1 tablet (10 mg total) by mouth every evening. 90 tablet 1    omeprazole (PRILOSEC) 20 MG capsule Take 1 capsule (20 mg total) by mouth once daily. 90 capsule 1    rosuvastatin (CRESTOR) 20 MG tablet Take 1 tablet (20 mg total) by mouth once daily. 90 tablet 1     Current Facility-Administered Medications   Medication Dose Route Frequency Provider Last Rate Last Dose     "triamcinolone acetonide injection 40 mg  40 mg Intra-articular 1 time in Clinic/HOD David Trivedi DO        triamcinolone acetonide injection 40 mg  40 mg Intra-articular 1 time in Clinic/HOD David Trivedi DO             ROS:  Review of Systems    Objective:       Physical Exam  Vitals:    09/23/20 1425   BP: 128/72   BP Location: Left arm   Patient Position: Sitting   BP Method: Large (Manual)   Pulse: 97   Temp: 98 °F (36.7 °C)   TempSrc: Oral   SpO2: 96%   Weight: 97 kg (213 lb 11.8 oz)   Height: 5' 6" (1.676 m)    Body mass index is 34.5 kg/m².  Weight: 97 kg (213 lb 11.8 oz)   Height: 5' 6" (167.6 cm)   Physical Exam  Vitals signs reviewed.   Constitutional:       General: She is not in acute distress.     Appearance: She is well-developed.   HENT:      Head: Normocephalic and atraumatic.   Eyes:      Conjunctiva/sclera: Conjunctivae normal.   Cardiovascular:      Rate and Rhythm: Normal rate.      Heart sounds: No murmur. No friction rub. No gallop.    Pulmonary:      Effort: Pulmonary effort is normal. No respiratory distress.      Breath sounds: No wheezing or rales.   Musculoskeletal:         General: Tenderness (left anteromedial knee) present. No swelling or deformity.   Skin:     General: Skin is warm and dry.   Neurological:      Mental Status: She is alert.      Cranial Nerves: No cranial nerve deficit.   Psychiatric:         Behavior: Behavior normal.             Assessment:     1. Benign essential hypertension    2. Seasonal allergic rhinitis due to pollen    3. Class 1 obesity due to excess calories with serious comorbidity and body mass index (BMI) of 33.0 to 33.9 in adult    4. Prediabetes    5. Mixed hyperlipidemia    6. Primary osteoarthritis of left knee    7. Need for influenza vaccination      Plan:     Yumiko was seen today for follow-up and hypertension.    Diagnoses and all orders for this visit:    Benign essential hypertension  BP controlled presently - reviewed " anti-hypertensive regimen - continue current therapy  -     CBC auto differential; Future  -     Comprehensive metabolic panel; Future    Seasonal allergic rhinitis due to pollen  Continue Singulair, discussed     Class 1 obesity due to excess calories with serious comorbidity and body mass index (BMI) of 33.0 to 33.9 in adult  Body mass index is 34.5 kg/m².    Prediabetes  Last A1c 5.6% - recheck   -     Hemoglobin A1C; Future    Mixed hyperlipidemia  Lipids controlled presently - reviewed anti-hyperlipidemic regimen - continue current therapy  -     Lipid Panel; Future    Primary osteoarthritis of left knee  Mild - no significant limitation in function  Discussed - continue conservative measures - to consider additional acupuncture treatments in future    Need for influenza vaccination  Counseled regarding seasonal influenza vaccination - administered  -     Influenza (FLUAD) - Quadrivalent (Adjuvanted) *Preferred* (65+) (PF)          Health Maintenance       Date Due Completion Date    HIV Screening 11/30/1970 ---    Influenza Vaccine (1) 08/01/2020 11/18/2019    Colorectal Cancer Screening 08/12/2021 8/12/2016    Pneumococcal Vaccine (65+ Low/Medium Risk) (2 of 2 - PPSV23) 11/29/2021 12/18/2019    Mammogram 12/30/2021 12/30/2019    DEXA SCAN 01/20/2023 1/20/2020    Lipid Panel 11/18/2024 11/18/2019    TETANUS VACCINE 04/11/2029 4/11/2019            Health Maintenance reviewed and addressed as per orders    Follow up in about 6 months (around 3/23/2021) for HTN.    The patient expressed understanding and no barriers to adherence were identified.     1. The patient indicates understanding of these issues and agrees with the plan. Brief care plan is updated and reviewed with the patient as applicable.     2. The patient is given an After Visit Summary that lists all medications with directions, allergies, orders placed during this encounter and follow-up instructions.     3. I have reviewed the patient's medical  information including past medical, family, and social history sections including the medications and allergies.     4. We discussed the patient's current medications. I reconciled the patient's medication list and prepared and supplied needed refills.       Kurt Limon MD  Internal Medicine-Pediatrics

## 2020-10-28 ENCOUNTER — OFFICE VISIT (OUTPATIENT)
Dept: FAMILY MEDICINE | Facility: CLINIC | Age: 65
End: 2020-10-28
Payer: MEDICARE

## 2020-10-28 VITALS
BODY MASS INDEX: 33.5 KG/M2 | TEMPERATURE: 98 F | DIASTOLIC BLOOD PRESSURE: 90 MMHG | HEIGHT: 66 IN | HEART RATE: 85 BPM | SYSTOLIC BLOOD PRESSURE: 160 MMHG | OXYGEN SATURATION: 95 % | WEIGHT: 208.44 LBS

## 2020-10-28 DIAGNOSIS — I10 BENIGN ESSENTIAL HYPERTENSION: ICD-10-CM

## 2020-10-28 DIAGNOSIS — Z23 NEED FOR INFLUENZA VACCINATION: ICD-10-CM

## 2020-10-28 DIAGNOSIS — M54.6 ACUTE LEFT-SIDED THORACIC BACK PAIN: ICD-10-CM

## 2020-10-28 DIAGNOSIS — M17.12 PRIMARY OSTEOARTHRITIS OF LEFT KNEE: Primary | ICD-10-CM

## 2020-10-28 DIAGNOSIS — E78.2 MIXED HYPERLIPIDEMIA: ICD-10-CM

## 2020-10-28 PROCEDURE — 99999 PR PBB SHADOW E&M-EST. PATIENT-LVL IV: CPT | Mod: PBBFAC,,, | Performed by: INTERNAL MEDICINE

## 2020-10-28 PROCEDURE — G0008 ADMIN INFLUENZA VIRUS VAC: HCPCS | Mod: S$GLB,,, | Performed by: INTERNAL MEDICINE

## 2020-10-28 PROCEDURE — 99214 OFFICE O/P EST MOD 30 MIN: CPT | Mod: 25,S$GLB,, | Performed by: INTERNAL MEDICINE

## 2020-10-28 PROCEDURE — G0008 FLU VACCINE - QUADRIVALENT - ADJUVANTED: ICD-10-PCS | Mod: S$GLB,,, | Performed by: INTERNAL MEDICINE

## 2020-10-28 PROCEDURE — 3008F PR BODY MASS INDEX (BMI) DOCUMENTED: ICD-10-PCS | Mod: CPTII,S$GLB,, | Performed by: INTERNAL MEDICINE

## 2020-10-28 PROCEDURE — 3077F PR MOST RECENT SYSTOLIC BLOOD PRESSURE >= 140 MM HG: ICD-10-PCS | Mod: CPTII,S$GLB,, | Performed by: INTERNAL MEDICINE

## 2020-10-28 PROCEDURE — 1101F PT FALLS ASSESS-DOCD LE1/YR: CPT | Mod: CPTII,S$GLB,, | Performed by: INTERNAL MEDICINE

## 2020-10-28 PROCEDURE — 90694 FLU VACCINE - QUADRIVALENT - ADJUVANTED: ICD-10-PCS | Mod: S$GLB,,, | Performed by: INTERNAL MEDICINE

## 2020-10-28 PROCEDURE — 90694 VACC AIIV4 NO PRSRV 0.5ML IM: CPT | Mod: S$GLB,,, | Performed by: INTERNAL MEDICINE

## 2020-10-28 PROCEDURE — 99499 UNLISTED E&M SERVICE: CPT | Mod: S$GLB,,, | Performed by: INTERNAL MEDICINE

## 2020-10-28 PROCEDURE — 3078F PR MOST RECENT DIASTOLIC BLOOD PRESSURE < 80 MM HG: ICD-10-PCS | Mod: CPTII,S$GLB,, | Performed by: INTERNAL MEDICINE

## 2020-10-28 PROCEDURE — 99499 RISK ADDL DX/OHS AUDIT: ICD-10-PCS | Mod: S$GLB,,, | Performed by: INTERNAL MEDICINE

## 2020-10-28 PROCEDURE — 3077F SYST BP >= 140 MM HG: CPT | Mod: CPTII,S$GLB,, | Performed by: INTERNAL MEDICINE

## 2020-10-28 PROCEDURE — 3008F BODY MASS INDEX DOCD: CPT | Mod: CPTII,S$GLB,, | Performed by: INTERNAL MEDICINE

## 2020-10-28 PROCEDURE — 3078F DIAST BP <80 MM HG: CPT | Mod: CPTII,S$GLB,, | Performed by: INTERNAL MEDICINE

## 2020-10-28 PROCEDURE — 1101F PR PT FALLS ASSESS DOC 0-1 FALLS W/OUT INJ PAST YR: ICD-10-PCS | Mod: CPTII,S$GLB,, | Performed by: INTERNAL MEDICINE

## 2020-10-28 PROCEDURE — 99214 PR OFFICE/OUTPT VISIT, EST, LEVL IV, 30-39 MIN: ICD-10-PCS | Mod: 25,S$GLB,, | Performed by: INTERNAL MEDICINE

## 2020-10-28 PROCEDURE — 99999 PR PBB SHADOW E&M-EST. PATIENT-LVL IV: ICD-10-PCS | Mod: PBBFAC,,, | Performed by: INTERNAL MEDICINE

## 2020-10-28 RX ORDER — CYCLOBENZAPRINE HCL 5 MG
TABLET ORAL
Qty: 20 TABLET | Refills: 0 | Status: SHIPPED | OUTPATIENT
Start: 2020-10-28 | End: 2020-11-11

## 2020-10-28 RX ORDER — NABUMETONE 500 MG/1
500 TABLET, FILM COATED ORAL 2 TIMES DAILY PRN
Qty: 20 TABLET | Refills: 0 | Status: SHIPPED | OUTPATIENT
Start: 2020-10-28 | End: 2020-11-07

## 2020-10-28 NOTE — PROGRESS NOTES
Subjective:     Chief Complaint  Chief Complaint   Patient presents with    left side upper back pain    Knee Pain       HPI  Yumiko Boyd is a 64 y.o. female with medical diagnoses as listed in the medical history and problem list that presents for above complaint(s).    Knee and back pain  2 weeks ago with mild left sided mid to upper back pain   No trauma - having trouble sleeping at times  Used a heating pad with mild relief  1 week prior back of both knees started aching - left knee pain improved however right knee pain radiates to hip     Patient Care Team:  Kurt Limon MD as PCP - General (Internal Medicine)  Opla Wheeler LPN as Licensed Practical Nurse  Opal Wheeler LPN as Care Coordinator      PAST MEDICAL HISTORY:  Past Medical History:   Diagnosis Date    Hyperlipidemia     Hypertension     Recurrent kidney stones        PAST SURGICAL HISTORY:  Past Surgical History:   Procedure Laterality Date    [facility]       SOCIAL HISTORY:  Social History     Socioeconomic History    Marital status:      Spouse name: Not on file    Number of children: Not on file    Years of education: Not on file    Highest education level: Not on file   Occupational History    Not on file   Social Needs    Financial resource strain: Not on file    Food insecurity     Worry: Not on file     Inability: Not on file    Transportation needs     Medical: Not on file     Non-medical: Not on file   Tobacco Use    Smoking status: Never Smoker    Smokeless tobacco: Never Used   Substance and Sexual Activity    Alcohol use: Yes     Frequency: Monthly or less     Drinks per session: 1 or 2     Binge frequency: Never    Drug use: Never    Sexual activity: Not Currently     Partners: Male     Comment:  since 2000s   Lifestyle    Physical activity     Days per week: Not on file     Minutes per session: Not on file    Stress: Not on file   Relationships    Social  connections     Talks on phone: Not on file     Gets together: Not on file     Attends Voodoo service: Not on file     Active member of club or organization: Not on file     Attends meetings of clubs or organizations: Not on file     Relationship status: Not on file   Other Topics Concern    Not on file   Social History Narrative    Not on file       FAMILY HISTORY:  Family History   Problem Relation Age of Onset    Hypertension Mother     Stroke Mother     Hyperlipidemia Mother     Colon cancer Father     Heart disease Father     Hyperlipidemia Father     Obesity Sister     Hypertension Sister     Diabetes Mellitus Sister     Hyperlipidemia Brother     Hyperlipidemia Brother     Obesity Sister     Hypertension Sister     Ovarian cancer Maternal Aunt        ALLERGIES AND MEDICATIONS: updated and reviewed.  Review of patient's allergies indicates:  No Known Allergies  Current Outpatient Medications   Medication Sig Dispense Refill    amLODIPine (NORVASC) 5 MG tablet Take 1 tablet (5 mg total) by mouth once daily. 90 tablet 1    ipratropium (ATROVENT) 42 mcg (0.06 %) nasal spray INSTILL 2 SPRAYS INTO EACH NOSTRIL 4 TIMES DAILY AS NEEDED FOR RHINITIS, NASAL CONGESTION AND DRIP 45 mL 1    lisinopriL-hydrochlorothiazide (PRINZIDE,ZESTORETIC) 20-12.5 mg per tablet Take 1 tablet by mouth once daily. 90 tablet 1    montelukast (SINGULAIR) 10 mg tablet Take 1 tablet (10 mg total) by mouth every evening. 90 tablet 1    omeprazole (PRILOSEC) 20 MG capsule Take 1 capsule (20 mg total) by mouth once daily. 90 capsule 1    rosuvastatin (CRESTOR) 20 MG tablet Take 1 tablet (20 mg total) by mouth once daily. 90 tablet 1    cyclobenzaprine (FLEXERIL) 5 MG tablet Take 1-2 tablets twice daily as needed for back pain/muscle spasm 20 tablet 0    loratadine (CLARITIN) 10 mg tablet Take 1 tablet (10 mg total) by mouth once daily. (Patient not taking: Reported on 10/28/2020) 30 tablet 0    nabumetone (RELAFEN)  "500 MG tablet Take 1 tablet (500 mg total) by mouth 2 (two) times daily as needed for Pain. 20 tablet 0     Current Facility-Administered Medications   Medication Dose Route Frequency Provider Last Rate Last Dose    triamcinolone acetonide injection 40 mg  40 mg Intra-articular 1 time in Clinic/HOD David Trivedi DO        triamcinolone acetonide injection 40 mg  40 mg Intra-articular 1 time in Clinic/HOD David Trivedi DO             ROS:  Review of Systems   Constitutional: Negative for chills, diaphoresis and fever.   Musculoskeletal: Positive for arthralgias (knees) and back pain.       Objective:       Physical Exam  Vitals:    10/28/20 0927 10/28/20 1009   BP: (!) 154/80 (!) 160/90   BP Location: Left arm    Patient Position: Sitting    BP Method: Large (Manual)    Pulse: 85    Temp: 98.3 °F (36.8 °C)    TempSrc: Oral    SpO2: 95%    Weight: 94.6 kg (208 lb 7.1 oz)    Height: 5' 6" (1.676 m)     Body mass index is 33.64 kg/m².  Weight: 94.6 kg (208 lb 7.1 oz)   Height: 5' 6" (167.6 cm)   Physical Exam  Vitals signs reviewed.   Constitutional:       General: She is not in acute distress.     Appearance: She is well-developed.   HENT:      Head: Normocephalic and atraumatic.   Eyes:      Conjunctiva/sclera: Conjunctivae normal.   Neck:      Musculoskeletal: Normal range of motion.   Cardiovascular:      Rate and Rhythm: Normal rate.   Pulmonary:      Effort: Pulmonary effort is normal.   Musculoskeletal:         General: Tenderness (R posterior knee pain/popliteal fossa) present. No deformity.   Skin:     General: Skin is warm and dry.   Neurological:      Mental Status: She is alert.      Cranial Nerves: No cranial nerve deficit.   Psychiatric:         Behavior: Behavior normal.             Assessment:     1. Primary osteoarthritis of left knee    2. Acute left-sided thoracic back pain    3. Benign essential hypertension    4. Mixed hyperlipidemia    5. Need for influenza vaccination      Plan: "     Yumiko was seen today for left side upper back pain and knee pain.    Diagnoses and all orders for this visit:    Primary osteoarthritis of left knee  History of mild knee OA - likely symptomatic   Seen by Ortho in January 2019  Limited NSAID recommend then consider Ortho referral if symptoms refractory  Counseled regarding potential side effects of medication, including GI irritation  -     nabumetone (RELAFEN) 500 MG tablet; Take 1 tablet (500 mg total) by mouth 2 (two) times daily as needed for Pain.    Acute left-sided thoracic back pain  Mild musculoskeletal pain - limited muscle relaxant prescribed   -     cyclobenzaprine (FLEXERIL) 5 MG tablet; Take 1-2 tablets twice daily as needed for back pain/muscle spasm    Benign essential hypertension  Blood pressure is not controlled today. Medication changes made today: None. Patient will come back in 2-4 weeks for recheck of blood pressure. Patient also asked to check blood pressure at home and bring recordings to next office visit    Mixed hyperlipidemia  Lipids controlled presently - reviewed anti-hyperlipidemic regimen - continue current therapy    Need for influenza vaccination  Counseled regarding seasonal influenza vaccination - administered  -     Influenza (FLUAD) - Quadrivalent (Adjuvanted) *Preferred* (65+) (PF)          Health Maintenance       Date Due Completion Date    HIV Screening 11/30/1970 ---    Influenza Vaccine (1) 08/01/2020 11/18/2019    Colorectal Cancer Screening 08/12/2021 8/12/2016    Pneumococcal Vaccine (65+ Low/Medium Risk) (2 of 2 - PPSV23) 11/29/2021 12/18/2019    Mammogram 12/30/2021 12/30/2019    DEXA SCAN 01/20/2023 1/20/2020    Lipid Panel 11/18/2024 11/18/2019    TETANUS VACCINE 04/11/2029 4/11/2019            Health Maintenance reviewed and addressed as per orders    No follow-ups on file.    The patient expressed understanding and no barriers to adherence were identified.     1. The patient indicates understanding of  these issues and agrees with the plan. Brief care plan is updated and reviewed with the patient as applicable.     2. The patient is given an After Visit Summary that lists all medications with directions, allergies, orders placed during this encounter and follow-up instructions.     3. I have reviewed the patient's medical information including past medical, family, and social history sections including the medications and allergies.     4. We discussed the patient's current medications. I reconciled the patient's medication list and prepared and supplied needed refills.       Kurt Limon MD  Internal Medicine-Pediatrics

## 2020-10-28 NOTE — PATIENT INSTRUCTIONS
Reducing Knee Pain and Swelling    Many treatments can help reduce pain and swelling in your knee. Your healthcare provider or physical therapist may suggest one or more of the following treatments:  · Icing your knee helps reduce swelling. You may be asked to ice your knee once a day or more. Apply ice for about 15 to 20 minutes at a time, with at least 40 minutes between sessions. Always keep a towel between the ice and your skin.   · Keeping your leg raised above your heart helps excess fluid flow out of your knee joint. This reduces swelling.  · Compression means wrapping an elastic bandage or neoprene sleeve snugly around your knees. This keeps fluid from collecting in your knee joint.  · Electrical stimulation, done by a physical therapist or , can help reduce excess fluid in your knee joint.  · Anti-inflammatory medicines may be prescribed by your healthcare provider. You may take pills or receive injections in your knee.  · Isometric (rubi) exercises strengthen the muscles that support your knee joint. They also help reduce excess fluid in your knee.  · Massage helps fluid drain away from your knee.  Date Last Reviewed: 10/13/2015  © 7608-7069 [email or web address]. [address] [place] [address], [place] [address]. All rights reserved. This information is not intended as a substitute for professional medical care. Always follow your healthcare professional's instructions.

## 2020-11-11 ENCOUNTER — CLINICAL SUPPORT (OUTPATIENT)
Dept: FAMILY MEDICINE | Facility: CLINIC | Age: 65
End: 2020-11-11
Payer: MEDICARE

## 2020-11-11 VITALS — DIASTOLIC BLOOD PRESSURE: 68 MMHG | OXYGEN SATURATION: 96 % | SYSTOLIC BLOOD PRESSURE: 132 MMHG | HEART RATE: 72 BPM

## 2020-11-11 DIAGNOSIS — I10 BENIGN ESSENTIAL HYPERTENSION: Primary | ICD-10-CM

## 2020-11-11 PROCEDURE — 99999 PR PBB SHADOW E&M-EST. PATIENT-LVL II: CPT | Mod: PBBFAC,,,

## 2020-11-11 PROCEDURE — 99999 PR PBB SHADOW E&M-EST. PATIENT-LVL II: ICD-10-PCS | Mod: PBBFAC,,,

## 2020-11-11 NOTE — PROGRESS NOTES
Yumiko Boyd 64 y.o. female is here today for Blood Pressure check.   History of HTN yes.    Review of patient's allergies indicates:  No Known Allergies  Creatinine   Date Value Ref Range Status   06/15/2020 0.8 0.5 - 1.4 mg/dL Final     Sodium   Date Value Ref Range Status   06/15/2020 140 136 - 145 mmol/L Final     Potassium   Date Value Ref Range Status   06/15/2020 4.1 3.5 - 5.1 mmol/L Final   ]  Patient verifies taking blood pressure medications on a regular basis at the same time of the day.     Current Outpatient Medications:     amLODIPine (NORVASC) 5 MG tablet, Take 1 tablet (5 mg total) by mouth once daily., Disp: 90 tablet, Rfl: 1    cyclobenzaprine (FLEXERIL) 5 MG tablet, Take 1-2 tablets twice daily as needed for back pain/muscle spasm, Disp: 20 tablet, Rfl: 0    ipratropium (ATROVENT) 42 mcg (0.06 %) nasal spray, INSTILL 2 SPRAYS INTO EACH NOSTRIL 4 TIMES DAILY AS NEEDED FOR RHINITIS, NASAL CONGESTION AND DRIP, Disp: 45 mL, Rfl: 1    lisinopriL-hydrochlorothiazide (PRINZIDE,ZESTORETIC) 20-12.5 mg per tablet, Take 1 tablet by mouth once daily., Disp: 90 tablet, Rfl: 1    loratadine (CLARITIN) 10 mg tablet, Take 1 tablet (10 mg total) by mouth once daily. (Patient not taking: Reported on 10/28/2020), Disp: 30 tablet, Rfl: 0    montelukast (SINGULAIR) 10 mg tablet, Take 1 tablet (10 mg total) by mouth every evening., Disp: 90 tablet, Rfl: 1    omeprazole (PRILOSEC) 20 MG capsule, Take 1 capsule (20 mg total) by mouth once daily., Disp: 90 capsule, Rfl: 1    rosuvastatin (CRESTOR) 20 MG tablet, Take 1 tablet (20 mg total) by mouth once daily., Disp: 90 tablet, Rfl: 1    Current Facility-Administered Medications:     triamcinolone acetonide injection 40 mg, 40 mg, Intra-articular, 1 time in Clinic/HOD, David Trivedi,     triamcinolone acetonide injection 40 mg, 40 mg, Intra-articular, 1 time in Clinic/HOD, David Trivedi, DO  Does patient have record of home blood pressure readings yes.  Readings have been averaging 152/83  Last dose of blood pressure medication was taken at 8am this morning.  Patient is asymptomatic.   Complains of none.    Vitals:    11/11/20 0900 11/11/20 0915   BP: (!) 142/76 132/68   BP Location: Left arm Left arm   Patient Position: Sitting Sitting   BP Method: Medium (Manual) Medium (Manual)   Pulse: 72    SpO2: 96%          Dr. Kurt Limon notified.

## 2021-01-12 ENCOUNTER — TELEPHONE (OUTPATIENT)
Dept: FAMILY MEDICINE | Facility: CLINIC | Age: 66
End: 2021-01-12

## 2021-01-12 ENCOUNTER — OFFICE VISIT (OUTPATIENT)
Dept: FAMILY MEDICINE | Facility: CLINIC | Age: 66
End: 2021-01-12
Payer: MEDICARE

## 2021-01-12 VITALS
TEMPERATURE: 98 F | HEART RATE: 84 BPM | HEIGHT: 66 IN | OXYGEN SATURATION: 97 % | WEIGHT: 208.44 LBS | DIASTOLIC BLOOD PRESSURE: 78 MMHG | RESPIRATION RATE: 14 BRPM | SYSTOLIC BLOOD PRESSURE: 140 MMHG | BODY MASS INDEX: 33.5 KG/M2

## 2021-01-12 DIAGNOSIS — E78.2 MIXED HYPERLIPIDEMIA: ICD-10-CM

## 2021-01-12 DIAGNOSIS — R21 RASH: ICD-10-CM

## 2021-01-12 DIAGNOSIS — J32.9 SINUSITIS, UNSPECIFIED CHRONICITY, UNSPECIFIED LOCATION: Primary | ICD-10-CM

## 2021-01-12 DIAGNOSIS — I10 BENIGN ESSENTIAL HYPERTENSION: ICD-10-CM

## 2021-01-12 PROCEDURE — 1159F MED LIST DOCD IN RCRD: CPT | Mod: S$GLB,,, | Performed by: NURSE PRACTITIONER

## 2021-01-12 PROCEDURE — 1159F PR MEDICATION LIST DOCUMENTED IN MEDICAL RECORD: ICD-10-PCS | Mod: S$GLB,,, | Performed by: NURSE PRACTITIONER

## 2021-01-12 PROCEDURE — 3077F SYST BP >= 140 MM HG: CPT | Mod: CPTII,S$GLB,, | Performed by: NURSE PRACTITIONER

## 2021-01-12 PROCEDURE — 99214 OFFICE O/P EST MOD 30 MIN: CPT | Mod: S$GLB,,, | Performed by: NURSE PRACTITIONER

## 2021-01-12 PROCEDURE — 3288F FALL RISK ASSESSMENT DOCD: CPT | Mod: CPTII,S$GLB,, | Performed by: NURSE PRACTITIONER

## 2021-01-12 PROCEDURE — 3078F PR MOST RECENT DIASTOLIC BLOOD PRESSURE < 80 MM HG: ICD-10-PCS | Mod: CPTII,S$GLB,, | Performed by: NURSE PRACTITIONER

## 2021-01-12 PROCEDURE — 3008F BODY MASS INDEX DOCD: CPT | Mod: CPTII,S$GLB,, | Performed by: NURSE PRACTITIONER

## 2021-01-12 PROCEDURE — 3008F PR BODY MASS INDEX (BMI) DOCUMENTED: ICD-10-PCS | Mod: CPTII,S$GLB,, | Performed by: NURSE PRACTITIONER

## 2021-01-12 PROCEDURE — 3078F DIAST BP <80 MM HG: CPT | Mod: CPTII,S$GLB,, | Performed by: NURSE PRACTITIONER

## 2021-01-12 PROCEDURE — 1126F AMNT PAIN NOTED NONE PRSNT: CPT | Mod: S$GLB,,, | Performed by: NURSE PRACTITIONER

## 2021-01-12 PROCEDURE — 1126F PR PAIN SEVERITY QUANTIFIED, NO PAIN PRESENT: ICD-10-PCS | Mod: S$GLB,,, | Performed by: NURSE PRACTITIONER

## 2021-01-12 PROCEDURE — 99999 PR PBB SHADOW E&M-EST. PATIENT-LVL IV: ICD-10-PCS | Mod: PBBFAC,,, | Performed by: NURSE PRACTITIONER

## 2021-01-12 PROCEDURE — 99214 PR OFFICE/OUTPT VISIT, EST, LEVL IV, 30-39 MIN: ICD-10-PCS | Mod: S$GLB,,, | Performed by: NURSE PRACTITIONER

## 2021-01-12 PROCEDURE — 3077F PR MOST RECENT SYSTOLIC BLOOD PRESSURE >= 140 MM HG: ICD-10-PCS | Mod: CPTII,S$GLB,, | Performed by: NURSE PRACTITIONER

## 2021-01-12 PROCEDURE — 1101F PR PT FALLS ASSESS DOC 0-1 FALLS W/OUT INJ PAST YR: ICD-10-PCS | Mod: CPTII,S$GLB,, | Performed by: NURSE PRACTITIONER

## 2021-01-12 PROCEDURE — 1101F PT FALLS ASSESS-DOCD LE1/YR: CPT | Mod: CPTII,S$GLB,, | Performed by: NURSE PRACTITIONER

## 2021-01-12 PROCEDURE — 99999 PR PBB SHADOW E&M-EST. PATIENT-LVL IV: CPT | Mod: PBBFAC,,, | Performed by: NURSE PRACTITIONER

## 2021-01-12 PROCEDURE — 3288F PR FALLS RISK ASSESSMENT DOCUMENTED: ICD-10-PCS | Mod: CPTII,S$GLB,, | Performed by: NURSE PRACTITIONER

## 2021-01-12 RX ORDER — TRIAMCINOLONE ACETONIDE 5 MG/G
CREAM TOPICAL 2 TIMES DAILY
Qty: 30 G | Refills: 0 | Status: SHIPPED | OUTPATIENT
Start: 2021-01-12 | End: 2021-01-22

## 2021-01-12 RX ORDER — AMOXICILLIN AND CLAVULANATE POTASSIUM 875; 125 MG/1; MG/1
1 TABLET, FILM COATED ORAL 2 TIMES DAILY
Qty: 20 TABLET | Refills: 0 | Status: SHIPPED | OUTPATIENT
Start: 2021-01-12 | End: 2021-01-22

## 2021-03-11 ENCOUNTER — PATIENT OUTREACH (OUTPATIENT)
Dept: ADMINISTRATIVE | Facility: HOSPITAL | Age: 66
End: 2021-03-11

## 2021-03-11 DIAGNOSIS — Z12.31 ENCOUNTER FOR SCREENING MAMMOGRAM FOR MALIGNANT NEOPLASM OF BREAST: Primary | ICD-10-CM

## 2021-03-16 ENCOUNTER — LAB VISIT (OUTPATIENT)
Dept: LAB | Facility: HOSPITAL | Age: 66
End: 2021-03-16
Attending: INTERNAL MEDICINE
Payer: MEDICARE

## 2021-03-16 DIAGNOSIS — R73.03 PREDIABETES: ICD-10-CM

## 2021-03-16 DIAGNOSIS — E78.2 MIXED HYPERLIPIDEMIA: ICD-10-CM

## 2021-03-16 DIAGNOSIS — I10 BENIGN ESSENTIAL HYPERTENSION: ICD-10-CM

## 2021-03-16 LAB
ALBUMIN SERPL BCP-MCNC: 3.5 G/DL (ref 3.5–5.2)
ALP SERPL-CCNC: 92 U/L (ref 55–135)
ALT SERPL W/O P-5'-P-CCNC: 14 U/L (ref 10–44)
ANION GAP SERPL CALC-SCNC: 6 MMOL/L (ref 8–16)
AST SERPL-CCNC: 20 U/L (ref 10–40)
BASOPHILS # BLD AUTO: 0.06 K/UL (ref 0–0.2)
BASOPHILS NFR BLD: 1.1 % (ref 0–1.9)
BILIRUB SERPL-MCNC: 0.5 MG/DL (ref 0.1–1)
BUN SERPL-MCNC: 11 MG/DL (ref 8–23)
CALCIUM SERPL-MCNC: 9.4 MG/DL (ref 8.7–10.5)
CHLORIDE SERPL-SCNC: 106 MMOL/L (ref 95–110)
CHOLEST SERPL-MCNC: 148 MG/DL (ref 120–199)
CHOLEST/HDLC SERPL: 3.3 {RATIO} (ref 2–5)
CO2 SERPL-SCNC: 31 MMOL/L (ref 23–29)
CREAT SERPL-MCNC: 0.8 MG/DL (ref 0.5–1.4)
DIFFERENTIAL METHOD: NORMAL
EOSINOPHIL # BLD AUTO: 0.1 K/UL (ref 0–0.5)
EOSINOPHIL NFR BLD: 2.5 % (ref 0–8)
ERYTHROCYTE [DISTWIDTH] IN BLOOD BY AUTOMATED COUNT: 13.4 % (ref 11.5–14.5)
EST. GFR  (AFRICAN AMERICAN): >60 ML/MIN/1.73 M^2
EST. GFR  (NON AFRICAN AMERICAN): >60 ML/MIN/1.73 M^2
ESTIMATED AVG GLUCOSE: 117 MG/DL (ref 68–131)
GLUCOSE SERPL-MCNC: 129 MG/DL (ref 70–110)
HBA1C MFR BLD: 5.7 % (ref 4–5.6)
HCT VFR BLD AUTO: 44.3 % (ref 37–48.5)
HDLC SERPL-MCNC: 45 MG/DL (ref 40–75)
HDLC SERPL: 30.4 % (ref 20–50)
HGB BLD-MCNC: 14.4 G/DL (ref 12–16)
IMM GRANULOCYTES # BLD AUTO: 0.02 K/UL (ref 0–0.04)
IMM GRANULOCYTES NFR BLD AUTO: 0.4 % (ref 0–0.5)
LDLC SERPL CALC-MCNC: 76.4 MG/DL (ref 63–159)
LYMPHOCYTES # BLD AUTO: 1.6 K/UL (ref 1–4.8)
LYMPHOCYTES NFR BLD: 28.2 % (ref 18–48)
MCH RBC QN AUTO: 30.3 PG (ref 27–31)
MCHC RBC AUTO-ENTMCNC: 32.5 G/DL (ref 32–36)
MCV RBC AUTO: 93 FL (ref 82–98)
MONOCYTES # BLD AUTO: 0.6 K/UL (ref 0.3–1)
MONOCYTES NFR BLD: 10.3 % (ref 4–15)
NEUTROPHILS # BLD AUTO: 3.3 K/UL (ref 1.8–7.7)
NEUTROPHILS NFR BLD: 57.5 % (ref 38–73)
NONHDLC SERPL-MCNC: 103 MG/DL
NRBC BLD-RTO: 0 /100 WBC
PLATELET # BLD AUTO: 321 K/UL (ref 150–350)
PMV BLD AUTO: 10.5 FL (ref 9.2–12.9)
POTASSIUM SERPL-SCNC: 4.1 MMOL/L (ref 3.5–5.1)
PROT SERPL-MCNC: 7.1 G/DL (ref 6–8.4)
RBC # BLD AUTO: 4.76 M/UL (ref 4–5.4)
SODIUM SERPL-SCNC: 143 MMOL/L (ref 136–145)
TRIGL SERPL-MCNC: 133 MG/DL (ref 30–150)
WBC # BLD AUTO: 5.64 K/UL (ref 3.9–12.7)

## 2021-03-16 PROCEDURE — 85025 COMPLETE CBC W/AUTO DIFF WBC: CPT | Performed by: INTERNAL MEDICINE

## 2021-03-16 PROCEDURE — 80053 COMPREHEN METABOLIC PANEL: CPT | Performed by: INTERNAL MEDICINE

## 2021-03-16 PROCEDURE — 80061 LIPID PANEL: CPT | Performed by: INTERNAL MEDICINE

## 2021-03-16 PROCEDURE — 36415 COLL VENOUS BLD VENIPUNCTURE: CPT | Mod: PO | Performed by: INTERNAL MEDICINE

## 2021-03-16 PROCEDURE — 83036 HEMOGLOBIN GLYCOSYLATED A1C: CPT | Performed by: INTERNAL MEDICINE

## 2021-03-24 ENCOUNTER — TELEPHONE (OUTPATIENT)
Dept: FAMILY MEDICINE | Facility: CLINIC | Age: 66
End: 2021-03-24

## 2021-04-05 ENCOUNTER — PATIENT MESSAGE (OUTPATIENT)
Dept: ADMINISTRATIVE | Facility: HOSPITAL | Age: 66
End: 2021-04-05

## 2021-04-09 ENCOUNTER — PATIENT OUTREACH (OUTPATIENT)
Dept: ADMINISTRATIVE | Facility: HOSPITAL | Age: 66
End: 2021-04-09

## 2021-04-13 DIAGNOSIS — K21.00 GASTROESOPHAGEAL REFLUX DISEASE WITH ESOPHAGITIS: ICD-10-CM

## 2021-04-13 DIAGNOSIS — J30.1 SEASONAL ALLERGIC RHINITIS DUE TO POLLEN: ICD-10-CM

## 2021-04-13 DIAGNOSIS — I10 BENIGN ESSENTIAL HYPERTENSION: ICD-10-CM

## 2021-04-13 DIAGNOSIS — E78.2 MIXED HYPERLIPIDEMIA: ICD-10-CM

## 2021-04-13 RX ORDER — MONTELUKAST SODIUM 10 MG/1
10 TABLET ORAL NIGHTLY
Qty: 90 TABLET | Refills: 1 | Status: SHIPPED | OUTPATIENT
Start: 2021-04-13 | End: 2021-10-04

## 2021-04-13 RX ORDER — OMEPRAZOLE 20 MG/1
20 CAPSULE, DELAYED RELEASE ORAL DAILY
Qty: 90 CAPSULE | Refills: 1 | Status: SHIPPED | OUTPATIENT
Start: 2021-04-13 | End: 2021-10-04

## 2021-04-13 RX ORDER — LORATADINE 10 MG/1
10 TABLET ORAL DAILY
Qty: 30 TABLET | Refills: 0 | Status: SHIPPED | OUTPATIENT
Start: 2021-04-13 | End: 2021-04-14

## 2021-04-13 RX ORDER — ROSUVASTATIN CALCIUM 20 MG/1
20 TABLET, COATED ORAL DAILY
Qty: 90 TABLET | Refills: 1 | Status: SHIPPED | OUTPATIENT
Start: 2021-04-13 | End: 2021-10-04

## 2021-04-13 RX ORDER — LISINOPRIL AND HYDROCHLOROTHIAZIDE 12.5; 2 MG/1; MG/1
1 TABLET ORAL DAILY
Qty: 90 TABLET | Refills: 1 | Status: SHIPPED | OUTPATIENT
Start: 2021-04-13 | End: 2021-10-04

## 2021-04-13 RX ORDER — AMLODIPINE BESYLATE 5 MG/1
5 TABLET ORAL DAILY
Qty: 90 TABLET | Refills: 1 | Status: SHIPPED | OUTPATIENT
Start: 2021-04-13 | End: 2021-10-04

## 2021-07-01 ENCOUNTER — TELEPHONE (OUTPATIENT)
Dept: FAMILY MEDICINE | Facility: CLINIC | Age: 66
End: 2021-07-01

## 2021-07-08 ENCOUNTER — OFFICE VISIT (OUTPATIENT)
Dept: INTERNAL MEDICINE | Facility: CLINIC | Age: 66
End: 2021-07-08
Payer: MEDICARE

## 2021-07-08 VITALS
WEIGHT: 199.94 LBS | TEMPERATURE: 99 F | SYSTOLIC BLOOD PRESSURE: 138 MMHG | HEIGHT: 66 IN | HEART RATE: 83 BPM | BODY MASS INDEX: 32.13 KG/M2 | OXYGEN SATURATION: 98 % | DIASTOLIC BLOOD PRESSURE: 78 MMHG

## 2021-07-08 DIAGNOSIS — H66.001 NON-RECURRENT ACUTE SUPPURATIVE OTITIS MEDIA OF RIGHT EAR WITHOUT SPONTANEOUS RUPTURE OF TYMPANIC MEMBRANE: Primary | ICD-10-CM

## 2021-07-08 PROCEDURE — 3288F FALL RISK ASSESSMENT DOCD: CPT | Mod: CPTII,S$GLB,, | Performed by: INTERNAL MEDICINE

## 2021-07-08 PROCEDURE — 1159F MED LIST DOCD IN RCRD: CPT | Mod: S$GLB,,, | Performed by: INTERNAL MEDICINE

## 2021-07-08 PROCEDURE — 3008F BODY MASS INDEX DOCD: CPT | Mod: CPTII,S$GLB,, | Performed by: INTERNAL MEDICINE

## 2021-07-08 PROCEDURE — 3288F PR FALLS RISK ASSESSMENT DOCUMENTED: ICD-10-PCS | Mod: CPTII,S$GLB,, | Performed by: INTERNAL MEDICINE

## 2021-07-08 PROCEDURE — 1125F PR PAIN SEVERITY QUANTIFIED, PAIN PRESENT: ICD-10-PCS | Mod: S$GLB,,, | Performed by: INTERNAL MEDICINE

## 2021-07-08 PROCEDURE — 99999 PR PBB SHADOW E&M-EST. PATIENT-LVL V: CPT | Mod: PBBFAC,,, | Performed by: INTERNAL MEDICINE

## 2021-07-08 PROCEDURE — 1125F AMNT PAIN NOTED PAIN PRSNT: CPT | Mod: S$GLB,,, | Performed by: INTERNAL MEDICINE

## 2021-07-08 PROCEDURE — 3008F PR BODY MASS INDEX (BMI) DOCUMENTED: ICD-10-PCS | Mod: CPTII,S$GLB,, | Performed by: INTERNAL MEDICINE

## 2021-07-08 PROCEDURE — 99213 OFFICE O/P EST LOW 20 MIN: CPT | Mod: S$GLB,,, | Performed by: INTERNAL MEDICINE

## 2021-07-08 PROCEDURE — 1159F PR MEDICATION LIST DOCUMENTED IN MEDICAL RECORD: ICD-10-PCS | Mod: S$GLB,,, | Performed by: INTERNAL MEDICINE

## 2021-07-08 PROCEDURE — 99213 PR OFFICE/OUTPT VISIT, EST, LEVL III, 20-29 MIN: ICD-10-PCS | Mod: S$GLB,,, | Performed by: INTERNAL MEDICINE

## 2021-07-08 PROCEDURE — 99999 PR PBB SHADOW E&M-EST. PATIENT-LVL V: ICD-10-PCS | Mod: PBBFAC,,, | Performed by: INTERNAL MEDICINE

## 2021-07-08 PROCEDURE — 1101F PT FALLS ASSESS-DOCD LE1/YR: CPT | Mod: CPTII,S$GLB,, | Performed by: INTERNAL MEDICINE

## 2021-07-08 PROCEDURE — 1101F PR PT FALLS ASSESS DOC 0-1 FALLS W/OUT INJ PAST YR: ICD-10-PCS | Mod: CPTII,S$GLB,, | Performed by: INTERNAL MEDICINE

## 2021-07-08 RX ORDER — AMOXICILLIN 875 MG/1
875 TABLET, FILM COATED ORAL EVERY 12 HOURS
Qty: 20 TABLET | Refills: 0 | Status: SHIPPED | OUTPATIENT
Start: 2021-07-08 | End: 2022-01-25

## 2021-07-09 ENCOUNTER — PATIENT OUTREACH (OUTPATIENT)
Dept: ADMINISTRATIVE | Facility: OTHER | Age: 66
End: 2021-07-09

## 2021-07-15 ENCOUNTER — OFFICE VISIT (OUTPATIENT)
Dept: OTOLARYNGOLOGY | Facility: CLINIC | Age: 66
End: 2021-07-15
Payer: MEDICARE

## 2021-07-15 VITALS
SYSTOLIC BLOOD PRESSURE: 120 MMHG | TEMPERATURE: 98 F | DIASTOLIC BLOOD PRESSURE: 70 MMHG | WEIGHT: 203.69 LBS | HEIGHT: 66 IN | BODY MASS INDEX: 32.73 KG/M2

## 2021-07-15 DIAGNOSIS — H60.501 ACUTE OTITIS EXTERNA OF RIGHT EAR, UNSPECIFIED TYPE: Primary | ICD-10-CM

## 2021-07-15 DIAGNOSIS — H91.90 PERCEIVED HEARING CHANGES: ICD-10-CM

## 2021-07-15 PROCEDURE — 99203 OFFICE O/P NEW LOW 30 MIN: CPT | Mod: S$GLB,,, | Performed by: NURSE PRACTITIONER

## 2021-07-15 PROCEDURE — 1126F PR PAIN SEVERITY QUANTIFIED, NO PAIN PRESENT: ICD-10-PCS | Mod: S$GLB,,, | Performed by: NURSE PRACTITIONER

## 2021-07-15 PROCEDURE — 1126F AMNT PAIN NOTED NONE PRSNT: CPT | Mod: S$GLB,,, | Performed by: NURSE PRACTITIONER

## 2021-07-15 PROCEDURE — 99203 PR OFFICE/OUTPT VISIT, NEW, LEVL III, 30-44 MIN: ICD-10-PCS | Mod: S$GLB,,, | Performed by: NURSE PRACTITIONER

## 2021-07-15 PROCEDURE — 1159F PR MEDICATION LIST DOCUMENTED IN MEDICAL RECORD: ICD-10-PCS | Mod: S$GLB,,, | Performed by: NURSE PRACTITIONER

## 2021-07-15 PROCEDURE — 1101F PR PT FALLS ASSESS DOC 0-1 FALLS W/OUT INJ PAST YR: ICD-10-PCS | Mod: CPTII,S$GLB,, | Performed by: NURSE PRACTITIONER

## 2021-07-15 PROCEDURE — 3008F BODY MASS INDEX DOCD: CPT | Mod: CPTII,S$GLB,, | Performed by: NURSE PRACTITIONER

## 2021-07-15 PROCEDURE — 3288F PR FALLS RISK ASSESSMENT DOCUMENTED: ICD-10-PCS | Mod: CPTII,S$GLB,, | Performed by: NURSE PRACTITIONER

## 2021-07-15 PROCEDURE — 1101F PT FALLS ASSESS-DOCD LE1/YR: CPT | Mod: CPTII,S$GLB,, | Performed by: NURSE PRACTITIONER

## 2021-07-15 PROCEDURE — 1159F MED LIST DOCD IN RCRD: CPT | Mod: S$GLB,,, | Performed by: NURSE PRACTITIONER

## 2021-07-15 PROCEDURE — 3288F FALL RISK ASSESSMENT DOCD: CPT | Mod: CPTII,S$GLB,, | Performed by: NURSE PRACTITIONER

## 2021-07-15 PROCEDURE — 3008F PR BODY MASS INDEX (BMI) DOCUMENTED: ICD-10-PCS | Mod: CPTII,S$GLB,, | Performed by: NURSE PRACTITIONER

## 2021-07-15 RX ORDER — NEOMYCIN SULFATE, POLYMYXIN B SULFATE AND HYDROCORTISONE 10; 3.5; 1 MG/ML; MG/ML; [USP'U]/ML
3 SUSPENSION/ DROPS AURICULAR (OTIC) 3 TIMES DAILY
Qty: 6 ML | Refills: 0 | Status: SHIPPED | OUTPATIENT
Start: 2021-07-15 | End: 2021-07-25

## 2021-07-24 ENCOUNTER — CLINICAL SUPPORT (OUTPATIENT)
Dept: URGENT CARE | Facility: CLINIC | Age: 66
End: 2021-07-24
Payer: MEDICARE

## 2021-07-24 DIAGNOSIS — Z20.822 EXPOSURE TO COVID-19 VIRUS: Primary | ICD-10-CM

## 2021-07-24 LAB
CTP QC/QA: YES
SARS-COV-2 RDRP RESP QL NAA+PROBE: NEGATIVE

## 2021-07-24 PROCEDURE — U0002 COVID-19 LAB TEST NON-CDC: HCPCS | Mod: QW,S$GLB,, | Performed by: NURSE PRACTITIONER

## 2021-07-24 PROCEDURE — U0002: ICD-10-PCS | Mod: QW,S$GLB,, | Performed by: NURSE PRACTITIONER

## 2021-07-30 ENCOUNTER — OFFICE VISIT (OUTPATIENT)
Dept: OTOLARYNGOLOGY | Facility: CLINIC | Age: 66
End: 2021-07-30
Payer: MEDICARE

## 2021-07-30 VITALS
DIASTOLIC BLOOD PRESSURE: 68 MMHG | WEIGHT: 205.25 LBS | BODY MASS INDEX: 33.13 KG/M2 | RESPIRATION RATE: 18 BRPM | SYSTOLIC BLOOD PRESSURE: 132 MMHG | TEMPERATURE: 98 F

## 2021-07-30 DIAGNOSIS — J31.0 GUSTATORY RHINITIS: ICD-10-CM

## 2021-07-30 DIAGNOSIS — H91.90 PERCEIVED HEARING CHANGES: ICD-10-CM

## 2021-07-30 DIAGNOSIS — H60.501 ACUTE OTITIS EXTERNA OF RIGHT EAR, UNSPECIFIED TYPE: Primary | ICD-10-CM

## 2021-07-30 PROCEDURE — 99213 PR OFFICE/OUTPT VISIT, EST, LEVL III, 20-29 MIN: ICD-10-PCS | Mod: S$GLB,,, | Performed by: NURSE PRACTITIONER

## 2021-07-30 PROCEDURE — 3078F DIAST BP <80 MM HG: CPT | Mod: CPTII,S$GLB,, | Performed by: NURSE PRACTITIONER

## 2021-07-30 PROCEDURE — 3075F PR MOST RECENT SYSTOLIC BLOOD PRESS GE 130-139MM HG: ICD-10-PCS | Mod: CPTII,S$GLB,, | Performed by: NURSE PRACTITIONER

## 2021-07-30 PROCEDURE — 3044F PR MOST RECENT HEMOGLOBIN A1C LEVEL <7.0%: ICD-10-PCS | Mod: CPTII,S$GLB,, | Performed by: NURSE PRACTITIONER

## 2021-07-30 PROCEDURE — 3008F PR BODY MASS INDEX (BMI) DOCUMENTED: ICD-10-PCS | Mod: CPTII,S$GLB,, | Performed by: NURSE PRACTITIONER

## 2021-07-30 PROCEDURE — 1159F MED LIST DOCD IN RCRD: CPT | Mod: CPTII,S$GLB,, | Performed by: NURSE PRACTITIONER

## 2021-07-30 PROCEDURE — 1125F AMNT PAIN NOTED PAIN PRSNT: CPT | Mod: CPTII,S$GLB,, | Performed by: NURSE PRACTITIONER

## 2021-07-30 PROCEDURE — 1159F PR MEDICATION LIST DOCUMENTED IN MEDICAL RECORD: ICD-10-PCS | Mod: CPTII,S$GLB,, | Performed by: NURSE PRACTITIONER

## 2021-07-30 PROCEDURE — 3078F PR MOST RECENT DIASTOLIC BLOOD PRESSURE < 80 MM HG: ICD-10-PCS | Mod: CPTII,S$GLB,, | Performed by: NURSE PRACTITIONER

## 2021-07-30 PROCEDURE — 3044F HG A1C LEVEL LT 7.0%: CPT | Mod: CPTII,S$GLB,, | Performed by: NURSE PRACTITIONER

## 2021-07-30 PROCEDURE — 99213 OFFICE O/P EST LOW 20 MIN: CPT | Mod: S$GLB,,, | Performed by: NURSE PRACTITIONER

## 2021-07-30 PROCEDURE — 1160F RVW MEDS BY RX/DR IN RCRD: CPT | Mod: CPTII,S$GLB,, | Performed by: NURSE PRACTITIONER

## 2021-07-30 PROCEDURE — 1125F PR PAIN SEVERITY QUANTIFIED, PAIN PRESENT: ICD-10-PCS | Mod: CPTII,S$GLB,, | Performed by: NURSE PRACTITIONER

## 2021-07-30 PROCEDURE — 1160F PR REVIEW ALL MEDS BY PRESCRIBER/CLIN PHARMACIST DOCUMENTED: ICD-10-PCS | Mod: CPTII,S$GLB,, | Performed by: NURSE PRACTITIONER

## 2021-07-30 PROCEDURE — 3075F SYST BP GE 130 - 139MM HG: CPT | Mod: CPTII,S$GLB,, | Performed by: NURSE PRACTITIONER

## 2021-07-30 PROCEDURE — 3008F BODY MASS INDEX DOCD: CPT | Mod: CPTII,S$GLB,, | Performed by: NURSE PRACTITIONER

## 2021-07-30 RX ORDER — IPRATROPIUM BROMIDE 42 UG/1
2 SPRAY, METERED NASAL 4 TIMES DAILY PRN
Qty: 15 ML | Refills: 0 | Status: SHIPPED | OUTPATIENT
Start: 2021-07-30 | End: 2021-08-11

## 2021-08-16 ENCOUNTER — CLINICAL SUPPORT (OUTPATIENT)
Dept: AUDIOLOGY | Facility: CLINIC | Age: 66
End: 2021-08-16
Payer: MEDICARE

## 2021-08-16 DIAGNOSIS — H90.3 SENSORINEURAL HEARING LOSS, BILATERAL: Primary | ICD-10-CM

## 2021-08-16 PROCEDURE — 92567 PR TYMPA2METRY: ICD-10-PCS | Mod: S$GLB,,, | Performed by: AUDIOLOGIST

## 2021-08-16 PROCEDURE — 92557 COMPREHENSIVE HEARING TEST: CPT | Mod: S$GLB,,, | Performed by: AUDIOLOGIST

## 2021-08-16 PROCEDURE — 92557 PR COMPREHENSIVE HEARING TEST: ICD-10-PCS | Mod: S$GLB,,, | Performed by: AUDIOLOGIST

## 2021-08-16 PROCEDURE — 92567 TYMPANOMETRY: CPT | Mod: S$GLB,,, | Performed by: AUDIOLOGIST

## 2021-10-26 ENCOUNTER — PES CALL (OUTPATIENT)
Dept: ADMINISTRATIVE | Facility: CLINIC | Age: 66
End: 2021-10-26
Payer: MEDICARE

## 2021-11-03 ENCOUNTER — OFFICE VISIT (OUTPATIENT)
Dept: FAMILY MEDICINE | Facility: CLINIC | Age: 66
End: 2021-11-03
Payer: MEDICARE

## 2021-11-03 VITALS
DIASTOLIC BLOOD PRESSURE: 68 MMHG | OXYGEN SATURATION: 97 % | TEMPERATURE: 99 F | BODY MASS INDEX: 33.45 KG/M2 | HEART RATE: 77 BPM | HEIGHT: 66 IN | WEIGHT: 208.13 LBS | SYSTOLIC BLOOD PRESSURE: 132 MMHG

## 2021-11-03 DIAGNOSIS — Z00.00 ENCOUNTER FOR PREVENTIVE HEALTH EXAMINATION: Primary | ICD-10-CM

## 2021-11-03 DIAGNOSIS — I10 BENIGN ESSENTIAL HYPERTENSION: ICD-10-CM

## 2021-11-03 DIAGNOSIS — K21.00 GASTROESOPHAGEAL REFLUX DISEASE WITH ESOPHAGITIS WITHOUT HEMORRHAGE: Chronic | ICD-10-CM

## 2021-11-03 DIAGNOSIS — E78.2 MIXED HYPERLIPIDEMIA: ICD-10-CM

## 2021-11-03 DIAGNOSIS — R73.03 PREDIABETES: ICD-10-CM

## 2021-11-03 DIAGNOSIS — M17.0 OSTEOARTHRITIS OF BOTH KNEES, UNSPECIFIED OSTEOARTHRITIS TYPE: ICD-10-CM

## 2021-11-03 DIAGNOSIS — Z23 NEEDS FLU SHOT: ICD-10-CM

## 2021-11-03 DIAGNOSIS — Z12.11 COLON CANCER SCREENING: ICD-10-CM

## 2021-11-03 PROCEDURE — 3044F HG A1C LEVEL LT 7.0%: CPT | Mod: CPTII,S$GLB,, | Performed by: NURSE PRACTITIONER

## 2021-11-03 PROCEDURE — 99499 RISK ADDL DX/OHS AUDIT: ICD-10-PCS | Mod: S$GLB,,, | Performed by: NURSE PRACTITIONER

## 2021-11-03 PROCEDURE — 3044F PR MOST RECENT HEMOGLOBIN A1C LEVEL <7.0%: ICD-10-PCS | Mod: CPTII,S$GLB,, | Performed by: NURSE PRACTITIONER

## 2021-11-03 PROCEDURE — 1160F RVW MEDS BY RX/DR IN RCRD: CPT | Mod: CPTII,S$GLB,, | Performed by: NURSE PRACTITIONER

## 2021-11-03 PROCEDURE — 3075F PR MOST RECENT SYSTOLIC BLOOD PRESS GE 130-139MM HG: ICD-10-PCS | Mod: CPTII,S$GLB,, | Performed by: NURSE PRACTITIONER

## 2021-11-03 PROCEDURE — 1159F MED LIST DOCD IN RCRD: CPT | Mod: CPTII,S$GLB,, | Performed by: NURSE PRACTITIONER

## 2021-11-03 PROCEDURE — 1125F PR PAIN SEVERITY QUANTIFIED, PAIN PRESENT: ICD-10-PCS | Mod: CPTII,S$GLB,, | Performed by: NURSE PRACTITIONER

## 2021-11-03 PROCEDURE — 4010F ACE/ARB THERAPY RXD/TAKEN: CPT | Mod: CPTII,S$GLB,, | Performed by: NURSE PRACTITIONER

## 2021-11-03 PROCEDURE — 3078F DIAST BP <80 MM HG: CPT | Mod: CPTII,S$GLB,, | Performed by: NURSE PRACTITIONER

## 2021-11-03 PROCEDURE — 4010F PR ACE/ARB THEARPY RXD/TAKEN: ICD-10-PCS | Mod: CPTII,S$GLB,, | Performed by: NURSE PRACTITIONER

## 2021-11-03 PROCEDURE — 3008F BODY MASS INDEX DOCD: CPT | Mod: CPTII,S$GLB,, | Performed by: NURSE PRACTITIONER

## 2021-11-03 PROCEDURE — 1101F PR PT FALLS ASSESS DOC 0-1 FALLS W/OUT INJ PAST YR: ICD-10-PCS | Mod: CPTII,S$GLB,, | Performed by: NURSE PRACTITIONER

## 2021-11-03 PROCEDURE — 99999 PR PBB SHADOW E&M-EST. PATIENT-LVL V: ICD-10-PCS | Mod: PBBFAC,,, | Performed by: NURSE PRACTITIONER

## 2021-11-03 PROCEDURE — 3008F PR BODY MASS INDEX (BMI) DOCUMENTED: ICD-10-PCS | Mod: CPTII,S$GLB,, | Performed by: NURSE PRACTITIONER

## 2021-11-03 PROCEDURE — 90694 VACC AIIV4 NO PRSRV 0.5ML IM: CPT | Mod: S$GLB,,, | Performed by: NURSE PRACTITIONER

## 2021-11-03 PROCEDURE — 1170F PR FUNCTIONAL STATUS ASSESSED: ICD-10-PCS | Mod: CPTII,S$GLB,, | Performed by: NURSE PRACTITIONER

## 2021-11-03 PROCEDURE — G0439 PR MEDICARE ANNUAL WELLNESS SUBSEQUENT VISIT: ICD-10-PCS | Mod: S$GLB,,, | Performed by: NURSE PRACTITIONER

## 2021-11-03 PROCEDURE — 90694 FLU VACCINE - QUADRIVALENT - ADJUVANTED: ICD-10-PCS | Mod: S$GLB,,, | Performed by: NURSE PRACTITIONER

## 2021-11-03 PROCEDURE — 99499 UNLISTED E&M SERVICE: CPT | Mod: S$GLB,,, | Performed by: NURSE PRACTITIONER

## 2021-11-03 PROCEDURE — 1170F FXNL STATUS ASSESSED: CPT | Mod: CPTII,S$GLB,, | Performed by: NURSE PRACTITIONER

## 2021-11-03 PROCEDURE — 1160F PR REVIEW ALL MEDS BY PRESCRIBER/CLIN PHARMACIST DOCUMENTED: ICD-10-PCS | Mod: CPTII,S$GLB,, | Performed by: NURSE PRACTITIONER

## 2021-11-03 PROCEDURE — G0439 PPPS, SUBSEQ VISIT: HCPCS | Mod: S$GLB,,, | Performed by: NURSE PRACTITIONER

## 2021-11-03 PROCEDURE — G0008 FLU VACCINE - QUADRIVALENT - ADJUVANTED: ICD-10-PCS | Mod: S$GLB,,, | Performed by: NURSE PRACTITIONER

## 2021-11-03 PROCEDURE — 3288F FALL RISK ASSESSMENT DOCD: CPT | Mod: CPTII,S$GLB,, | Performed by: NURSE PRACTITIONER

## 2021-11-03 PROCEDURE — 1125F AMNT PAIN NOTED PAIN PRSNT: CPT | Mod: CPTII,S$GLB,, | Performed by: NURSE PRACTITIONER

## 2021-11-03 PROCEDURE — 1159F PR MEDICATION LIST DOCUMENTED IN MEDICAL RECORD: ICD-10-PCS | Mod: CPTII,S$GLB,, | Performed by: NURSE PRACTITIONER

## 2021-11-03 PROCEDURE — 99999 PR PBB SHADOW E&M-EST. PATIENT-LVL V: CPT | Mod: PBBFAC,,, | Performed by: NURSE PRACTITIONER

## 2021-11-03 PROCEDURE — 1101F PT FALLS ASSESS-DOCD LE1/YR: CPT | Mod: CPTII,S$GLB,, | Performed by: NURSE PRACTITIONER

## 2021-11-03 PROCEDURE — 3075F SYST BP GE 130 - 139MM HG: CPT | Mod: CPTII,S$GLB,, | Performed by: NURSE PRACTITIONER

## 2021-11-03 PROCEDURE — G0008 ADMIN INFLUENZA VIRUS VAC: HCPCS | Mod: S$GLB,,, | Performed by: NURSE PRACTITIONER

## 2021-11-03 PROCEDURE — 3288F PR FALLS RISK ASSESSMENT DOCUMENTED: ICD-10-PCS | Mod: CPTII,S$GLB,, | Performed by: NURSE PRACTITIONER

## 2021-11-03 PROCEDURE — 3078F PR MOST RECENT DIASTOLIC BLOOD PRESSURE < 80 MM HG: ICD-10-PCS | Mod: CPTII,S$GLB,, | Performed by: NURSE PRACTITIONER

## 2021-11-03 RX ORDER — VITAMIN E (DL,TOCOPHERYL ACET) 180 MG
CAPSULE ORAL
COMMUNITY

## 2021-11-11 DIAGNOSIS — Z12.11 COLON CANCER SCREENING: Primary | ICD-10-CM

## 2021-11-11 RX ORDER — POLYETHYLENE GLYCOL 3350, SODIUM SULFATE ANHYDROUS, SODIUM BICARBONATE, SODIUM CHLORIDE, POTASSIUM CHLORIDE 236; 22.74; 6.74; 5.86; 2.97 G/4L; G/4L; G/4L; G/4L; G/4L
4 POWDER, FOR SOLUTION ORAL ONCE
Qty: 4000 ML | Refills: 0 | Status: SHIPPED | OUTPATIENT
Start: 2021-11-11 | End: 2021-11-11

## 2021-11-18 ENCOUNTER — TELEPHONE (OUTPATIENT)
Dept: FAMILY MEDICINE | Facility: CLINIC | Age: 66
End: 2021-11-18
Payer: MEDICARE

## 2021-11-19 ENCOUNTER — TELEPHONE (OUTPATIENT)
Dept: FAMILY MEDICINE | Facility: CLINIC | Age: 66
End: 2021-11-19
Payer: MEDICARE

## 2021-11-19 DIAGNOSIS — E78.2 MIXED HYPERLIPIDEMIA: Primary | ICD-10-CM

## 2021-11-19 DIAGNOSIS — I10 BENIGN ESSENTIAL HYPERTENSION: ICD-10-CM

## 2021-11-19 DIAGNOSIS — R73.03 PREDIABETES: ICD-10-CM

## 2021-12-06 ENCOUNTER — ANESTHESIA EVENT (OUTPATIENT)
Dept: ENDOSCOPY | Facility: HOSPITAL | Age: 66
End: 2021-12-06
Payer: MEDICARE

## 2021-12-07 ENCOUNTER — HOSPITAL ENCOUNTER (OUTPATIENT)
Facility: HOSPITAL | Age: 66
Discharge: HOME | End: 2021-12-07
Attending: INTERNAL MEDICINE | Admitting: INTERNAL MEDICINE
Payer: MEDICARE

## 2021-12-07 ENCOUNTER — ANESTHESIA (OUTPATIENT)
Dept: ENDOSCOPY | Facility: HOSPITAL | Age: 66
End: 2021-12-07
Payer: MEDICARE

## 2021-12-07 VITALS
TEMPERATURE: 98 F | DIASTOLIC BLOOD PRESSURE: 64 MMHG | OXYGEN SATURATION: 98 % | HEART RATE: 72 BPM | RESPIRATION RATE: 18 BRPM | SYSTOLIC BLOOD PRESSURE: 132 MMHG

## 2021-12-07 DIAGNOSIS — Z12.11 COLON CANCER SCREENING: Primary | ICD-10-CM

## 2021-12-07 PROCEDURE — 25000003 PHARM REV CODE 250: Performed by: NURSE ANESTHETIST, CERTIFIED REGISTERED

## 2021-12-07 PROCEDURE — 37000008 HC ANESTHESIA 1ST 15 MINUTES: Performed by: INTERNAL MEDICINE

## 2021-12-07 PROCEDURE — D9220A PRA ANESTHESIA: Mod: CRNA,,, | Performed by: STUDENT IN AN ORGANIZED HEALTH CARE EDUCATION/TRAINING PROGRAM

## 2021-12-07 PROCEDURE — D9220A PRA ANESTHESIA: ICD-10-PCS | Mod: CRNA,,, | Performed by: STUDENT IN AN ORGANIZED HEALTH CARE EDUCATION/TRAINING PROGRAM

## 2021-12-07 PROCEDURE — G0121 COLON CA SCRN NOT HI RSK IND: HCPCS | Mod: ,,, | Performed by: INTERNAL MEDICINE

## 2021-12-07 PROCEDURE — 63600175 PHARM REV CODE 636 W HCPCS: Performed by: NURSE ANESTHETIST, CERTIFIED REGISTERED

## 2021-12-07 PROCEDURE — 25000003 PHARM REV CODE 250: Performed by: ANESTHESIOLOGY

## 2021-12-07 PROCEDURE — 37000009 HC ANESTHESIA EA ADD 15 MINS: Performed by: INTERNAL MEDICINE

## 2021-12-07 PROCEDURE — D9220A PRA ANESTHESIA: ICD-10-PCS | Mod: ANES,,, | Performed by: ANESTHESIOLOGY

## 2021-12-07 PROCEDURE — G0121 COLON CA SCRN NOT HI RSK IND: ICD-10-PCS | Mod: ,,, | Performed by: INTERNAL MEDICINE

## 2021-12-07 PROCEDURE — G0121 COLON CA SCRN NOT HI RSK IND: HCPCS | Performed by: INTERNAL MEDICINE

## 2021-12-07 PROCEDURE — D9220A PRA ANESTHESIA: Mod: ANES,,, | Performed by: ANESTHESIOLOGY

## 2021-12-07 RX ORDER — SODIUM CHLORIDE 9 MG/ML
INJECTION, SOLUTION INTRAVENOUS CONTINUOUS
Status: DISCONTINUED | OUTPATIENT
Start: 2021-12-07 | End: 2021-12-07 | Stop reason: HOSPADM

## 2021-12-07 RX ORDER — LIDOCAINE HYDROCHLORIDE 20 MG/ML
INJECTION INTRAVENOUS
Status: DISCONTINUED | OUTPATIENT
Start: 2021-12-07 | End: 2021-12-07

## 2021-12-07 RX ORDER — LIDOCAINE HYDROCHLORIDE 20 MG/ML
INJECTION, SOLUTION EPIDURAL; INFILTRATION; INTRACAUDAL; PERINEURAL
Status: DISCONTINUED
Start: 2021-12-07 | End: 2021-12-07 | Stop reason: HOSPADM

## 2021-12-07 RX ORDER — PROPOFOL 10 MG/ML
INJECTION, EMULSION INTRAVENOUS
Status: DISCONTINUED
Start: 2021-12-07 | End: 2021-12-07 | Stop reason: HOSPADM

## 2021-12-07 RX ORDER — PROPOFOL 10 MG/ML
VIAL (ML) INTRAVENOUS
Status: DISCONTINUED | OUTPATIENT
Start: 2021-12-07 | End: 2021-12-07

## 2021-12-07 RX ORDER — LIDOCAINE HYDROCHLORIDE 10 MG/ML
1 INJECTION, SOLUTION EPIDURAL; INFILTRATION; INTRACAUDAL; PERINEURAL ONCE
Status: DISCONTINUED | OUTPATIENT
Start: 2021-12-07 | End: 2021-12-07 | Stop reason: HOSPADM

## 2021-12-07 RX ADMIN — PROPOFOL 50 MG: 10 INJECTION, EMULSION INTRAVENOUS at 11:12

## 2021-12-07 RX ADMIN — LIDOCAINE HYDROCHLORIDE 100 MG: 20 INJECTION, SOLUTION INTRAVENOUS at 10:12

## 2021-12-07 RX ADMIN — PROPOFOL 50 MG: 10 INJECTION, EMULSION INTRAVENOUS at 10:12

## 2021-12-07 RX ADMIN — PROPOFOL 30 MG: 10 INJECTION, EMULSION INTRAVENOUS at 11:12

## 2021-12-07 RX ADMIN — SODIUM CHLORIDE: 0.9 INJECTION, SOLUTION INTRAVENOUS at 10:12

## 2021-12-08 ENCOUNTER — PATIENT MESSAGE (OUTPATIENT)
Dept: ADMINISTRATIVE | Facility: HOSPITAL | Age: 66
End: 2021-12-08
Payer: MEDICARE

## 2021-12-14 ENCOUNTER — HOSPITAL ENCOUNTER (OUTPATIENT)
Dept: RADIOLOGY | Facility: HOSPITAL | Age: 66
Discharge: HOME | End: 2021-12-14
Attending: INTERNAL MEDICINE
Payer: MEDICARE

## 2021-12-14 DIAGNOSIS — Z12.31 ENCOUNTER FOR SCREENING MAMMOGRAM FOR MALIGNANT NEOPLASM OF BREAST: ICD-10-CM

## 2021-12-14 PROCEDURE — 77067 SCR MAMMO BI INCL CAD: CPT | Mod: 26,,, | Performed by: RADIOLOGY

## 2021-12-14 PROCEDURE — 77063 MAMMO DIGITAL SCREENING BILAT WITH TOMO: ICD-10-PCS | Mod: 26,,, | Performed by: RADIOLOGY

## 2021-12-14 PROCEDURE — 77063 BREAST TOMOSYNTHESIS BI: CPT | Mod: 26,,, | Performed by: RADIOLOGY

## 2021-12-14 PROCEDURE — 77067 MAMMO DIGITAL SCREENING BILAT WITH TOMO: ICD-10-PCS | Mod: 26,,, | Performed by: RADIOLOGY

## 2021-12-14 PROCEDURE — 77067 SCR MAMMO BI INCL CAD: CPT | Mod: TC,PO

## 2022-01-10 ENCOUNTER — LAB VISIT (OUTPATIENT)
Dept: PRIMARY CARE CLINIC | Facility: CLINIC | Age: 67
End: 2022-01-10
Payer: MEDICARE

## 2022-01-10 DIAGNOSIS — Z20.822 CONTACT WITH AND (SUSPECTED) EXPOSURE TO COVID-19: ICD-10-CM

## 2022-01-10 LAB
CTP QC/QA: YES
SARS-COV-2 AG RESP QL IA.RAPID: NEGATIVE

## 2022-01-10 PROCEDURE — 87811 SARS-COV-2 COVID19 W/OPTIC: CPT

## 2022-01-25 ENCOUNTER — OFFICE VISIT (OUTPATIENT)
Dept: OTOLARYNGOLOGY | Facility: CLINIC | Age: 67
End: 2022-01-25
Payer: MEDICARE

## 2022-01-25 ENCOUNTER — PATIENT OUTREACH (OUTPATIENT)
Dept: ADMINISTRATIVE | Facility: OTHER | Age: 67
End: 2022-01-25
Payer: MEDICARE

## 2022-01-25 VITALS — WEIGHT: 208 LBS | BODY MASS INDEX: 33.43 KG/M2 | HEIGHT: 66 IN

## 2022-01-25 DIAGNOSIS — H93.8X3 EAR FULLNESS, BILATERAL: ICD-10-CM

## 2022-01-25 DIAGNOSIS — B96.89 ACUTE BACTERIAL RHINOSINUSITIS: Primary | ICD-10-CM

## 2022-01-25 DIAGNOSIS — J01.90 ACUTE BACTERIAL RHINOSINUSITIS: Primary | ICD-10-CM

## 2022-01-25 PROCEDURE — 4010F ACE/ARB THERAPY RXD/TAKEN: CPT | Mod: CPTII,S$GLB,, | Performed by: NURSE PRACTITIONER

## 2022-01-25 PROCEDURE — 99214 PR OFFICE/OUTPT VISIT, EST, LEVL IV, 30-39 MIN: ICD-10-PCS | Mod: S$GLB,,, | Performed by: NURSE PRACTITIONER

## 2022-01-25 PROCEDURE — 1159F MED LIST DOCD IN RCRD: CPT | Mod: CPTII,S$GLB,, | Performed by: NURSE PRACTITIONER

## 2022-01-25 PROCEDURE — 1126F PR PAIN SEVERITY QUANTIFIED, NO PAIN PRESENT: ICD-10-PCS | Mod: CPTII,S$GLB,, | Performed by: NURSE PRACTITIONER

## 2022-01-25 PROCEDURE — 99214 OFFICE O/P EST MOD 30 MIN: CPT | Mod: S$GLB,,, | Performed by: NURSE PRACTITIONER

## 2022-01-25 PROCEDURE — 3008F BODY MASS INDEX DOCD: CPT | Mod: CPTII,S$GLB,, | Performed by: NURSE PRACTITIONER

## 2022-01-25 PROCEDURE — 1159F PR MEDICATION LIST DOCUMENTED IN MEDICAL RECORD: ICD-10-PCS | Mod: CPTII,S$GLB,, | Performed by: NURSE PRACTITIONER

## 2022-01-25 PROCEDURE — 3008F PR BODY MASS INDEX (BMI) DOCUMENTED: ICD-10-PCS | Mod: CPTII,S$GLB,, | Performed by: NURSE PRACTITIONER

## 2022-01-25 PROCEDURE — 4010F PR ACE/ARB THEARPY RXD/TAKEN: ICD-10-PCS | Mod: CPTII,S$GLB,, | Performed by: NURSE PRACTITIONER

## 2022-01-25 PROCEDURE — 1126F AMNT PAIN NOTED NONE PRSNT: CPT | Mod: CPTII,S$GLB,, | Performed by: NURSE PRACTITIONER

## 2022-01-25 RX ORDER — AMOXICILLIN 500 MG/1
500 TABLET, FILM COATED ORAL 2 TIMES DAILY
Qty: 20 TABLET | Refills: 0 | Status: SHIPPED | OUTPATIENT
Start: 2022-01-25 | End: 2022-02-04

## 2022-01-25 RX ORDER — FLUTICASONE PROPIONATE 50 MCG
1 SPRAY, SUSPENSION (ML) NASAL DAILY
Qty: 16 G | Refills: 2 | Status: SHIPPED | OUTPATIENT
Start: 2022-01-25 | End: 2022-04-24

## 2022-01-25 NOTE — PATIENT INSTRUCTIONS
Sinus Rinse  I want you to use a sinus rinse daily (available over the counter) before applying your nasal spray medications.  This rinse can help wash away mucus and crusting and allow for better absorption of the medications.  Please use the sinus rinse about 30 minutes before applying your medicated nasal sprays.      Flonase  You have been prescribed or instructed to take a nasal steroid spray. Examples of this medication include Flonase (fluticasone), Nasacort (triamcinolone), and Rhinocort (budesonide). Some symptoms will experience relief within a few days; however, it may take 2-3 weeks to begin to see improvement. This medication needs to be taken consistently to see results.    Use as directed and please be aware the Flonase takes 7-10 days of consistent use before becoming effective- so try to be patient :)    Helpful hints for maximizing medication into the nose  - Use the opposite hand to spray the nostril (example: right hand for left nostril). This will help avoid spraying the medication onto the septum (the area that divides the left and right nasal cavity.)  - Tilt the bottle so that it is facing at a slight angle up or straight back, but avoid pointing the bottle straight up while spraying.   - Gently sniff (do not snort) a few seconds after spraying.    Acute Sinusitis    Acute sinusitis is irritation and swelling of the sinuses. It is usually caused by a viral infection after a common cold.     What is acute sinusitis?  Sinuses are air-filled spaces in the skull behind the face. They are kept moist and clean by a lining of mucosa. Things such as pollen, smoke, and chemical fumes can irritate the mucosa. It can then swell up. As a response to irritation, the mucosa makes more mucus and other fluids. Tiny hairlike cilia cover the mucosa. Cilia help carry mucus toward the opening of the sinus. Too much mucus may cause the cilia to stop working. This blocks the sinus opening. A buildup of fluid  in the sinuses then causes pain and pressure. It can also encourage bacteria to grow in the sinuses.    Common symptoms of acute sinusitis  You may have:  · Facial soreness pain  · Headache  · Fever  · Fluid draining in the back of the throat (postnasal drip)  · Congestion  · Drainage that is thick and colored, instead of clear  · Cough    Diagnosing acute sinusitis  Your doctor will ask about your symptoms and health history. He or she will look at your ear, nose, and throat. You usually won't need to have X-rays taken.    The doctor may take a sample of mucus to check for bacteria. If you have sinusitis that keeps coming back, you may need imaging tests such as X-rays or CAT scans. This will help your doctor check for a structural problem that may be causing the infection.    Treating acute sinusitis  Treatment is aimed at unblocking the sinus opening and helping the cilia work again. You may need to take antihistamine and decongestant medicine. These can reduce inflammation and decrease the amount of fluid your sinuses make.  90% of sinus infections are caused by viruses and are not responsive to antibiotic.   If you have a bacterial infection, you will need to take antibiotic medicine for 10 to 14 days. Take this medicine until it is gone, even if you feel better.  Your provider will differentiate if your infection is viral or bacterial.

## 2022-01-25 NOTE — PROGRESS NOTES
Subjective:      Yumiko Boyd is a 66 y.o. female who was self-referred for ear infection.    Mrs. Boyd presents to [facility] for the evaluation of right sided ear symptoms.  She notes that her symptoms started around 12/26 and includes a clogged feeling in the right ear.  She believes this is an ear infection.  She also notes that she has been suffering with sinus symptoms that started around the same time.  She notes that she has a purulent nasal drainage that primarily goes down the back of her throat.  She denies any sinus pressure or hyposmia.  She is a prior patient of MELANIA Sarah.  There is not a family history of hearing loss at a young age.  There is not a prior history of ear surgery.  There is not a prior history of ear infections.  There is not a history of ear trauma.  She denies a history of significant noise exposure.  She does not wear hearing aids currently.  She has not had a hearing test recently.      Past Medical History  She has a past medical history of Hyperlipidemia, Hypertension, and Recurrent kidney stones.    Past Surgical History  She has a past surgical history that includes Tubal ligation (1980s) and Colonoscopy (N/A, 12/7/2021).    Family History  Her family history includes Colon cancer in her father; Depression in her daughter and daughter; Diabetes Mellitus in her sister; Heart disease in her father; Hyperlipidemia in her brother, brother, father, and mother; Hypertension in her daughter, mother, sister, and sister; Obesity in her sister and sister; Other in her daughter; Stroke in her mother.    Social History  She reports that she has never smoked. She has never used smokeless tobacco. She reports previous alcohol use. She reports that she does not use drugs.    Allergies  She has No Known Allergies.    Medications  She has a current medication list which includes the following prescription(s): amlodipine, lisinopril-hydrochlorothiazide, montelukast, hair,skin and  "nails(fa-biotin), omeprazole, rosuvastatin, amoxicillin, fluticasone propionate, ipratropium, ipratropium, loratadine, and triamcinolone acetonide 0.5%, and the following Facility-Administered Medications: triamcinolone acetonide and triamcinolone acetonide.    Review of Systems     Constitutional: Positive for fever (low grade about 1 week ago).  Negative for appetite change, chills, fatigue and unexpected weight loss.      HENT: Positive for ear infection, ear pain (pressure), hearing loss (muffled), postnasal drip, runny nose, sinus infection and sore throat.  Negative for ear discharge, facial swelling, mouth sores, nosebleeds, ringing in the ears, sinus pressure, stuffy nose and tonsil infection.      Respiratory:  Negative for cough, shortness of breath and wheezing.      Cardiovascular:  Negative for chest pain, foot swelling, irregular heartbeat and swollen veins.     Gastrointestinal:  Negative for abdominal pain, acid reflux, constipation, diarrhea, heartburn and vomiting.     Allergy: Negative for food allergies and seasonal allergies.     Neurological: Positive for light-headedness. Negative for headaches, seizures and tremors.          Objective:   Ht 5' 6" (1.676 m)   Wt 94.3 kg (208 lb)   BMI 33.57 kg/m²      Constitutional:   She is oriented to person, place, and time. She appears well-developed and well-nourished. She appears alert. She is cooperative.  Non-toxic appearance. She does not have a sickly appearance. She does not appear ill. No distress. Normal speech.      Head:  Normocephalic and atraumatic. Not macrocephalic and not microcephalic. Head is without abrasion, without right periorbital erythema, without left periorbital erythema and without TMJ tenderness. Facial strength is normal.      Ears:    Right Ear: No lacerations. No drainage, swelling or tenderness. No foreign bodies. No mastoid tenderness. Tympanic membrane is not injected, not scarred, not perforated, not erythematous, not " retracted and not bulging. No middle ear effusion. No hemotympanum.   Left Ear: No lacerations. No drainage, swelling or tenderness. No foreign bodies. No mastoid tenderness. Tympanic membrane is not injected, not scarred, not perforated, not erythematous, not retracted and not bulging.  No middle ear effusion. No hemotympanum.     Nose:  Mucosal edema, rhinorrhea (purulent drainage) and septal deviation present. No sinus tenderness or polyps. No epistaxis. Turbinates normal and no turbinate hypertrophy.  Right sinus exhibits no maxillary sinus tenderness and no frontal sinus tenderness. Left sinus exhibits no maxillary sinus tenderness and no frontal sinus tenderness.     Mouth/Throat  Oropharynx not clear and moist and abnormal uvula midline. Abnormal dentition. No uvula swelling. No oropharyngeal exudate.         Neck:  Trachea normal, phonation normal and no adenopathy.        Head (right side): No submental, no preauricular and no posterior auricular adenopathy present.        Head (left side): No submental, no preauricular and no posterior auricular adenopathy present.     She has no cervical adenopathy.     Pulmonary/Chest:   Effort normal.     Psychiatric:   She has a normal mood and affect. Her speech is normal and behavior is normal.     Neurological:   She is alert and oriented to person, place, and time. She has neurological normal, alert and oriented.     Procedure    None    Data Reviewed  WBC (K/uL)   Date Value   12/14/2021 5.75     Eosinophil % (%)   Date Value   12/14/2021 2.1     Eos # (K/uL)   Date Value   12/14/2021 0.1     Platelets (K/uL)   Date Value   12/14/2021 280     Glucose (mg/dL)   Date Value   12/14/2021 114 (H)     No results found for: IGE    Imaging  No pertinent imaging available.    Audiogram  No audiometric testing available in clinic today.     Assessment:     1. Acute bacterial rhinosinusitis    2. Ear fullness, bilateral      Plan:     I had a long discussion with the patient  regarding her condition and the further workup and management options.      Acute bacterial rhinosinusitis  -     amoxicillin (AMOXIL) 500 MG Tab; Take 1 tablet (500 mg total) by mouth 2 (two) times daily. for 10 days  -     Sinus rinse daily   -     Flonase    Ear fullness, bilateral  -     fluticasone propionate (FLONASE) 50 mcg/actuation nasal spray; 1 spray (50 mcg total) by Each Nostril route once daily.  -     Proper spray technique reviewed    Follow up if symptoms worsen or fail to improve in 2 weeks

## 2022-01-27 ENCOUNTER — IMMUNIZATION (OUTPATIENT)
Dept: OBSTETRICS AND GYNECOLOGY | Facility: CLINIC | Age: 67
End: 2022-01-27
Payer: MEDICARE

## 2022-01-27 DIAGNOSIS — Z23 NEED FOR VACCINATION: Primary | ICD-10-CM

## 2022-01-27 PROCEDURE — 0004A COVID-19, MRNA, LNP-S, PF, 30 MCG/0.3 ML DOSE VACCINE: CPT | Mod: PBBFAC | Performed by: FAMILY MEDICINE

## 2022-02-21 ENCOUNTER — HOSPITAL ENCOUNTER (OUTPATIENT)
Dept: RADIOLOGY | Facility: OTHER | Age: 67
Discharge: HOME | End: 2022-02-21
Attending: INTERNAL MEDICINE
Payer: MEDICARE

## 2022-02-21 DIAGNOSIS — R92.8 ABNORMAL MAMMOGRAM OF RIGHT BREAST: ICD-10-CM

## 2022-02-21 PROCEDURE — 77061 BREAST TOMOSYNTHESIS UNI: CPT | Mod: 26,RT,, | Performed by: RADIOLOGY

## 2022-02-21 PROCEDURE — 77065 DX MAMMO INCL CAD UNI: CPT | Mod: TC,RT

## 2022-02-21 PROCEDURE — 77065 DX MAMMO INCL CAD UNI: CPT | Mod: 26,RT,, | Performed by: RADIOLOGY

## 2022-02-21 PROCEDURE — 77061 MAMMO DIGITAL DIAGNOSTIC RIGHT WITH TOMO: ICD-10-PCS | Mod: 26,RT,, | Performed by: RADIOLOGY

## 2022-02-21 PROCEDURE — 77065 MAMMO DIGITAL DIAGNOSTIC RIGHT WITH TOMO: ICD-10-PCS | Mod: 26,RT,, | Performed by: RADIOLOGY

## 2022-04-04 ENCOUNTER — PES CALL (OUTPATIENT)
Dept: ADMINISTRATIVE | Facility: CLINIC | Age: 67
End: 2022-04-04
Payer: MEDICARE

## 2022-08-16 ENCOUNTER — PES CALL (OUTPATIENT)
Dept: ADMINISTRATIVE | Facility: CLINIC | Age: 67
End: 2022-08-16
Payer: MEDICARE

## 2023-10-10 NOTE — PROGRESS NOTES
Shingrix vaccine given, patient advised to wait 15 mins.for  Monitoring. Patient verbalized an understanding.   Cyclophosphamide Pregnancy And Lactation Text: This medication is Pregnancy Category D and it isn't considered safe during pregnancy. This medication is excreted in breast milk.

## 2023-11-21 ENCOUNTER — HOSPITAL ENCOUNTER (EMERGENCY)
Facility: HOSPITAL | Age: 68
Discharge: HOME | End: 2023-11-21
Attending: EMERGENCY MEDICINE
Payer: MEDICARE

## 2023-11-21 VITALS
BODY MASS INDEX: 33.75 KG/M2 | RESPIRATION RATE: 16 BRPM | HEIGHT: 66 IN | TEMPERATURE: 98 F | DIASTOLIC BLOOD PRESSURE: 78 MMHG | HEART RATE: 83 BPM | OXYGEN SATURATION: 96 % | WEIGHT: 210 LBS | SYSTOLIC BLOOD PRESSURE: 132 MMHG

## 2023-11-21 DIAGNOSIS — J21.0 RSV (ACUTE BRONCHIOLITIS DUE TO RESPIRATORY SYNCYTIAL VIRUS): Primary | ICD-10-CM

## 2023-11-21 DIAGNOSIS — R05.2 SUBACUTE COUGH: ICD-10-CM

## 2023-11-21 LAB
CTP QC/QA: YES
CTP QC/QA: YES
INFLUENZA A ANTIGEN, POC: NEGATIVE
INFLUENZA B ANTIGEN, POC: NEGATIVE
POC RSV RAPID ANT MOLECULAR: POSITIVE
SARS-COV-2 RDRP RESP QL NAA+PROBE: NEGATIVE

## 2023-11-21 PROCEDURE — 87634 RSV DNA/RNA AMP PROBE: CPT | Mod: ER

## 2023-11-21 PROCEDURE — 99284 EMERGENCY DEPT VISIT MOD MDM: CPT | Mod: ER

## 2023-11-21 PROCEDURE — 25000003 PHARM REV CODE 250: Mod: ER | Performed by: NURSE PRACTITIONER

## 2023-11-21 PROCEDURE — 87635 SARS-COV-2 COVID-19 AMP PRB: CPT | Mod: ER | Performed by: NURSE PRACTITIONER

## 2023-11-21 PROCEDURE — 87804 INFLUENZA ASSAY W/OPTIC: CPT | Mod: 59,ER

## 2023-11-21 RX ORDER — PROMETHAZINE HYDROCHLORIDE AND DEXTROMETHORPHAN HYDROBROMIDE 6.25; 15 MG/5ML; MG/5ML
5 SYRUP ORAL NIGHTLY PRN
Qty: 50 ML | Refills: 0 | Status: SHIPPED | OUTPATIENT
Start: 2023-11-21 | End: 2023-12-01

## 2023-11-21 RX ORDER — PREDNISONE 20 MG/1
20 TABLET ORAL DAILY
Qty: 5 TABLET | Refills: 0 | Status: SHIPPED | OUTPATIENT
Start: 2023-11-21 | End: 2023-11-26

## 2023-11-21 RX ORDER — BENZONATATE 100 MG/1
100 CAPSULE ORAL 3 TIMES DAILY PRN
Qty: 30 CAPSULE | Refills: 0 | Status: SHIPPED | OUTPATIENT
Start: 2023-11-21 | End: 2023-12-01

## 2023-11-21 RX ORDER — ONDANSETRON 4 MG/1
4 TABLET, ORALLY DISINTEGRATING ORAL EVERY 8 HOURS PRN
Qty: 15 TABLET | Refills: 0 | Status: SHIPPED | OUTPATIENT
Start: 2023-11-21 | End: 2023-11-26

## 2023-11-21 RX ORDER — ONDANSETRON 4 MG/1
4 TABLET, ORALLY DISINTEGRATING ORAL
Status: COMPLETED | OUTPATIENT
Start: 2023-11-21 | End: 2023-11-21

## 2023-11-21 RX ADMIN — ONDANSETRON 4 MG: 4 TABLET, ORALLY DISINTEGRATING ORAL at 09:11

## 2023-11-22 NOTE — DISCHARGE INSTRUCTIONS
You were seen and evaluated in the ER today.  We are going to treat you for RSV.  Please increase fluids and rest.  Use Tessalon as needed for cough.  You can use Mucinex severe cold or Delsym to help with your cough and congestion.  Rotate Tylenol ibuprofen as needed.  Please take steroids if symptoms worsened.  Zofran for nausea. Please follow-up with your PCP as needed.  Please return to the ED for any worsening symptoms such as chest pain, shortness of breath, fever not controlled with Tylenol or ibuprofen or uncontrolled pain.      Our goal in the emergency department is to always give you outstanding care and exceptional service. You may receive a survey by mail or e-mail in the next week regarding your experience in our ED. We would greatly appreciate your completing and returning the survey. Your feedback provides us with a way to recognize our staff who give very good care and it helps us learn how to improve when your experience was below our aspiration of excellence.

## 2023-11-22 NOTE — ED PROVIDER NOTES
Source of History:  Patient, chart, daughter    Chief complaint:  URI (C/o productive cough, nasal congestion, and right ear pain x4 days. Denies taking any otc meds. Family at home exposed to RSV)      HPI:  Yumiko Boyd is a 67 y.o. female with medical history of hypertension, GERD presenting with cough, nasal congestion, ear pressure and nausea since Friday.  Patient states her grandson was recently diagnosed with RSV.  Patient states symptoms had improved but nausea worsened today.  Patient denies taking anything for her symptoms.    This is the extent to the patients complaints today here in the emergency department.    ROS: As per HPI and below:  Constitutional:  No fever.  No chills.  No change in appetite.  Positive for change in activity.    Eyes: No visual changes.  ENT:  Positive for nasal congestion.  Positive for sore throat.    Cardiovascular: No chest pain.  Respiratory: No shortness of breath positive for cough.  GI: No abdominal pain.  Positive for nausea.  Genito-Urinary: No abnormal urination.  Neurologic: No focal weakness.  No numbness.   MSK: no back pain.  Integument: No rashes or lesions.  Hematologic: No easy bruising.  Endocrine: No excessive thirst or urination.    Review of patient's allergies indicates:  No Known Allergies    PMH:  As per HPI and below:  Past Medical History:   Diagnosis Date    Hyperlipidemia     Hypertension     Recurrent kidney stones      Past Surgical History:   Procedure Laterality Date    COLONOSCOPY N/A 12/7/2021    Procedure: COLONOSCOPY;  Surgeon: Pantera Prajapati MD;  Location: [facility];  Service: Endoscopy;  Laterality: N/A;  Fully vaccinated 3/16/21, instructions mailed-Kpvt    TUBAL LIGATION  1980s       Social History     Tobacco Use    Smoking status: Never    Smokeless tobacco: Never   Substance Use Topics    Alcohol use: Not Currently    Drug use: Never       Physical Exam:    /78 (BP Location: Right arm, Patient Position: Sitting)   Pulse 83   " Temp 98.2 °F (36.8 °C)   Resp 16   Ht 5' 6" (1.676 m)   Wt 95.3 kg (210 lb)   SpO2 96%   BMI 33.89 kg/m²   Nursing note and vital signs reviewed.  Constitutional: No acute distress.  Nontoxic  Eyes: No conjunctival injection.  Extraocular muscles are intact.  ENT: Oropharynx clear. No tonisillar exudate or swelling. Uvula midline and normal. No elevation of posterior oropharynx.  Nasal congestion with mucosal edema  Cardiovascular: Regular rate and rhythm.  No murmurs. No gallops. No rubs.  Respiratory: Clear to auscultation bilaterally.  Good air movement.  No wheezes.  No rhonchi. No rales. No accessory muscle use.  Abdomen: Soft.  Not distended.  Nontender.  No guarding.  No rebound. Non-peritoneal.  Musculoskeletal: Good range of motion all joints.  No deformities.  Neck supple.  No meningismus.  Skin: No rashes seen.  Good turgor.  No abrasions.  No ecchymoses.  Neuro: alert and oriented x3,  no focal neurological deficits.  Psych: Appropriate, conversant    MDM    Emergent evaluation of a 66 yo female presenting for cough, congestion, nausea and sore throat since Friday.  Patient states symptoms have been intermittent.  Patient states nausea worsened today..  Patient was recently exposed to RSV.  Patient has been taking home medications with relief. On exam pt is A&Ox3. VSS. Nonfebrile and nontoxic appearing.  Mucous membranes pink and moist.  Clear nasal discharge with mucosal edema noted on exam.  Tonsils with no redness, erythema or exudates. Breath sounds clear bilaterally.  No rhonchi, rales or wheezing noted on exam.  Nonproductive cough noted on exam.  Abdomen soft and nontender. No rebound or guarding appreciated on exam.   BS WNL.  Pt speaking in full sentences.  Steady gait appreciated. Cap refill < 3 seconds.       History Acquisition   Additional history was acquired from other historians.  Daughter, chart  The patient's list of active medical problems, social history, medications, and " allergies as documented per RN staff has been reviewed.     Differential Diagnoses   Based on available information and the initial assessment, the working differential diagnoses considered during this evaluation include but are not limited to RSV, COVID, influenza, viral illness, asthma exacerbation, others.      LABS     Labs Reviewed   POCT RESPIRATORY SYNCYTIAL VIRUS BY MOLECULAR - Abnormal; Notable for the following components:       Result Value    POC RSV Rapid Ant Molecular Positive (*)     All other components within normal limits   SARS-COV-2 RDRP GENE    Narrative:     This test utilizes isothermal nucleic acid amplification technology to detect the SARS-CoV-2 RdRp nucleic acid segment. The analytical sensitivity (limit of detection) is 500 copies/swab.     A POSITIVE result is indicative of the presence of SARS-CoV-2 RNA; clinical correlation with patient history and other diagnostic information is necessary to determine patient infection status.    A NEGATIVE result means that SARS-CoV-2 nucleic acids are not present above the limit of detection. A NEGATIVE result should be treated as presumptive. It does not rule out the possibility of COVID-19 and should not be the sole basis for treatment decisions. If COVID-19 is strongly suspected based on clinical and exposure history, re-testing using an alternate molecular assay should be considered.     This test is only for use under the Food and Drug Administration s Emergency Use Authorization (EUA).     Commercial kits are provided by [employer]. Performance characteristics of the EUA have been independently verified by [facility] Department of Pathology and Laboratory Medicine.   _________________________________________________________________   The authorized Fact Sheet for Healthcare Providers and the authorized Fact Sheet for Patients of the ID NOW COVID-19 are available on the FDA website:     https://www.fda.gov/media/818940/download      https://www.fda.gov/media/856396/download       POCT INFLUENZA A/B MOLECULAR   POCT RAPID INFLUENZA A/B       Additional Consideration   All available testing was considered during the course of this workup.  Comorbidities taken into consideration during the patient's evaluation and treatment include weight, age.    Social determinants of health were taken into consideration during development of our treatment plan.    Medications   ondansetron disintegrating tablet 4 mg (4 mg Oral Given 11/21/23 2108)      ED Course as of 11/21/23 2120 Tue Nov 21, 2023 2110 Patient advised RSV positive.  Patient updated on results.  Advised to continue to use Tessalon as needed.  Advised to use Zofran as needed for nausea.  Promethazine and Tessalon for cough. Will give 5 day dose of prednisone to take if symptoms worsen.  Patient advised to continue to use albuterol needed.  Rotate Tylenol ibuprofen as needed for cough.  Advised to use Delsym or Mucinex as needed for cough.  Patient verbalized understanding of this plan of care.  All questions and concerns addressed. [RZ]   2115 Patient is hemodynamically stable, vital signs are normal. Discharge instructions given. Return to ED precautions discussed. Follow up as directed. Pt verbalized understanding of this plan.  Pt is stable for discharge.  [RZ]      ED Course User Index  [RZ] Emely Louie NP             CLINICAL IMPRESSION  1. RSV (acute bronchiolitis due to respiratory syncytial virus)    2. Subacute cough         ED Disposition Condition    Discharge Stable            Instruction:  I see no indication of an emergent process beyond that addressed during our encounter but have duly counseled the patient/family regarding the need for prompt follow-up as well as the indications that should prompt immediate return to the emergency room should new or worrisome developments occur.  The patient/family has been provided with  verbal and printed direction regarding our final diagnosis(es) as well as instructions regarding use of OTC and/or Rx medications intended to manage the patient's aforementioned conditions including:  ED Prescriptions       Medication Sig Dispense Start Date End Date Auth. Provider    benzonatate (TESSALON) 100 MG capsule Take 1 capsule (100 mg total) by mouth 3 (three) times daily as needed for Cough. 30 capsule 11/21/2023 12/1/2023 Emely Louie, MELANIA    promethazine-dextromethorphan (PROMETHAZINE-DM) 6.25-15 mg/5 mL Syrp Take 5 mLs by mouth nightly as needed. 50 mL 11/21/2023 12/1/2023 Emely Louie NP    ondansetron (ZOFRAN-ODT) 4 MG TbDL Take 1 tablet (4 mg total) by mouth every 8 (eight) hours as needed (nausea). 15 tablet 11/21/2023 11/26/2023 Emely Louie NP    predniSONE (DELTASONE) 20 MG tablet Take 1 tablet (20 mg total) by mouth once daily. for 5 days 5 tablet 11/21/2023 11/26/2023 Emely Louie, MELANIA          Patient has been advised of following recommended follow-up instructions:  Follow-up Information    None       The patient/family communicates understanding of all this information and all remaining questions and concerns were addressed at this time.      The patient's condition did not warrant review of the  and prescription of controlled substances.      This note was created using dictation software.  This program may occasionally mistype words and phrases.         Emely Louie NP  11/21/23 2120

## 2025-04-24 ENCOUNTER — HOSPITAL ENCOUNTER (EMERGENCY)
Facility: HOSPITAL | Age: 70
Discharge: HOME | End: 2025-04-24
Attending: STUDENT IN AN ORGANIZED HEALTH CARE EDUCATION/TRAINING PROGRAM
Payer: MEDICARE

## 2025-04-24 VITALS
RESPIRATION RATE: 20 BRPM | DIASTOLIC BLOOD PRESSURE: 83 MMHG | OXYGEN SATURATION: 97 % | HEART RATE: 66 BPM | HEIGHT: 65 IN | BODY MASS INDEX: 34.99 KG/M2 | TEMPERATURE: 99 F | SYSTOLIC BLOOD PRESSURE: 162 MMHG | WEIGHT: 210 LBS

## 2025-04-24 DIAGNOSIS — B34.9 VIRAL SYNDROME: ICD-10-CM

## 2025-04-24 DIAGNOSIS — Z20.828 EXPOSURE TO INFLUENZA: Primary | ICD-10-CM

## 2025-04-24 LAB
CTP QC/QA: YES
INFLUENZA A ANTIGEN, POC: NEGATIVE
INFLUENZA B ANTIGEN, POC: NEGATIVE
POC RAPID STREP A: NEGATIVE
SARS-COV-2 RDRP RESP QL NAA+PROBE: NEGATIVE

## 2025-04-24 PROCEDURE — 87804 INFLUENZA ASSAY W/OPTIC: CPT | Mod: 59,ER

## 2025-04-24 PROCEDURE — 99284 EMERGENCY DEPT VISIT MOD MDM: CPT | Mod: ER

## 2025-04-24 PROCEDURE — 87880 STREP A ASSAY W/OPTIC: CPT | Mod: ER

## 2025-04-24 PROCEDURE — 87635 SARS-COV-2 COVID-19 AMP PRB: CPT | Mod: ER | Performed by: NURSE PRACTITIONER

## 2025-04-24 RX ORDER — GUAIFENESIN AND DEXTROMETHORPHAN HYDROBROMIDE 10; 100 MG/5ML; MG/5ML
5 SYRUP ORAL 4 TIMES DAILY PRN
Qty: 120 ML | Refills: 0 | Status: SHIPPED | OUTPATIENT
Start: 2025-04-24 | End: 2025-05-04

## 2025-04-24 RX ORDER — BENZONATATE 100 MG/1
100 CAPSULE ORAL 3 TIMES DAILY PRN
Qty: 20 CAPSULE | Refills: 0 | Status: SHIPPED | OUTPATIENT
Start: 2025-04-24 | End: 2025-05-04

## 2025-04-24 RX ORDER — ONDANSETRON 4 MG/1
4 TABLET, FILM COATED ORAL EVERY 6 HOURS PRN
Qty: 12 TABLET | Refills: 0 | Status: SHIPPED | OUTPATIENT
Start: 2025-04-24

## 2025-04-25 NOTE — ED PROVIDER NOTES
Encounter Date: 4/24/2025       History     Chief Complaint   Patient presents with    URI     FEVER, COUGH, CONGESTION, x 3 DAYS      69 y.o. female who has a past medical history of Hyperlipidemia, Hypertension, and Recurrent kidney stones. presents to the emergency department due to  3 days of cough congestion fevers generalized body aches and post-tussive nausea.  Patient in contact with a person who tested positive for influenza B. cough is productive of clearish yellow sputum.  Denies any associated chest pain or shortness of breath.  Patient does report feeling nauseous after coughing fits.  Denies any abdominal pain or diarrhea.    The history is provided by the patient.     Review of patient's allergies indicates:  No Known Allergies  Past Medical History:   Diagnosis Date    Hyperlipidemia     Hypertension     Recurrent kidney stones      Past Surgical History:   Procedure Laterality Date    COLONOSCOPY N/A 12/7/2021    Procedure: COLONOSCOPY;  Surgeon: Pantera Prajapati MD;  Location: [facility];  Service: Endoscopy;  Laterality: N/A;  Fully vaccinated 3/16/21, instructions mailed-Kpvt    TUBAL LIGATION  1980s     Family History   Problem Relation Name Age of Onset    Hypertension Mother      Stroke Mother      Hyperlipidemia Mother      Colon cancer Father      Heart disease Father      Hyperlipidemia Father      Obesity Sister      Hypertension Sister      Diabetes Mellitus Sister      Hyperlipidemia Brother      Hyperlipidemia Brother      Obesity Sister      Hypertension Sister      Hypertension Daughter      Depression Daughter      Depression Daughter      Other Daughter          brain surgery x3     Social History[1]  Review of Systems   Constitutional:  Positive for chills and fever.   HENT:  Negative for sore throat.    Respiratory:  Positive for cough. Negative for shortness of breath.    Cardiovascular:  Negative for chest pain.   Gastrointestinal:  Positive for nausea. Negative for abdominal pain  and vomiting.   Genitourinary:  Negative for dysuria.   Musculoskeletal:  Positive for myalgias. Negative for back pain.   Skin:  Negative for rash.   Neurological:  Negative for weakness.   Hematological:  Does not bruise/bleed easily.       Physical Exam     Initial Vitals   BP Pulse Resp Temp SpO2   04/24/25 1800 04/24/25 1759 04/24/25 1759 04/24/25 1759 04/24/25 1759   (!) 153/79 77 20 98.7 °F (37.1 °C) 99 %      MAP       --                Physical Exam    Nursing note and vitals reviewed.  Constitutional: She is not diaphoretic. No distress.   HENT:   Head: Normocephalic and atraumatic.   Eyes: Conjunctivae and EOM are normal. Pupils are equal, round, and reactive to light.   Neck:   Normal range of motion.  Pulmonary/Chest: Breath sounds normal. No respiratory distress.   Abdominal: Abdomen is soft. Bowel sounds are normal. She exhibits no distension. There is no abdominal tenderness.   Musculoskeletal:         General: No tenderness. Normal range of motion.      Cervical back: Normal range of motion.     Neurological: She is alert.   Skin: Skin is warm. Capillary refill takes less than 2 seconds.   Psychiatric: Her behavior is normal.         ED Course   Procedures  Labs Reviewed   SARS-COV-2 RDRP GENE       Result Value    POC Rapid COVID Negative       Acceptable Yes      Narrative:     This test utilizes isothermal nucleic acid amplification technology to detect the SARS-CoV-2 RdRp nucleic acid segment. The analytical sensitivity (limit of detection) is 500 copies/swab.     A POSITIVE result is indicative of the presence of SARS-CoV-2 RNA; clinical correlation with patient history and other diagnostic information is necessary to determine patient infection status.    A NEGATIVE result means that SARS-CoV-2 nucleic acids are not present above the limit of detection. A NEGATIVE result should be treated as presumptive. It does not rule out the possibility of COVID-19 and should not be the sole  basis for treatment decisions. If COVID-19 is strongly suspected based on clinical and exposure history, re-testing using an alternate molecular assay should be considered.     Commercial kits are provided by [employer].       POCT INFLUENZA A/B MOLECULAR   POCT STREP A MOLECULAR   POCT STREP A, RAPID    POC Rapid Strep A negative     POCT RAPID INFLUENZA A/B    Influenza B Ag negative      Inflenza A Ag negative            Imaging Results    None          Medications - No data to display  Medical Decision Making:   Initial Assessment:   69 y.o. female who has a past medical history of Hyperlipidemia, Hypertension, and Recurrent kidney stones. presents to the emergency department due to  3 days of cough congestion fevers generalized body aches and post-tussive nausea.   After complete evaluation, including thorough history and physical exam, the patient's symptoms are most likely due to viral upper respiratory infection. There are no concerning features on physical exam to suggest bacterial otitis media/externa, sinusitis, pharyngitis, or peritonsillar abscess. Vital signs do not suggest sepsis. Lung sounds are clear and not consistent with pneumonia. There is no neck pain or limited ROM to suggest retropharyngeal abscess or meningitis. The patient will be treated with supportive care. Will provide RX for Tessalon Perles, Zofran, Robitussin DM upon D/C.   Differential Diagnosis:   Differential Diagnosis includes, but is not limited to:  Sepsis, meningitis, cavernous sinus thrombosis, nasal foreign body, otitis media/external, nasal polyp, bacterial sinusitis, allergic rhinitis, influenza, bacterial/viral pharyngitis, peritonsillar abscess, retropharyngeal abscess, bacterial/viral pneumonia.              ED Course as of 04/24/25 2013   Thu Apr 24, 2025 1933 SARS-CoV-2 RNA, Amplification, Qual: Negative [MM]   1933 Influenza B Ag: negative [MM]   1933 Inflenza A Ag: negative [MM]   1933 POC Rapid Strep A:  negative [MM]      ED Course User Index  [MM] Almita Voss NP          Medical Decision Making  Risk  OTC drugs.  Prescription drug management.           Clinical Impression:   Final diagnoses:  [Z20.828] Exposure to influenza (Primary)  [B34.9] Viral syndrome          ED Disposition Condition    Discharge Stable          ED Prescriptions       Medication Sig Dispense Start Date End Date Auth. Provider    ondansetron (ZOFRAN) 4 MG tablet Take 1 tablet (4 mg total) by mouth every 6 (six) hours as needed. 12 tablet 4/24/2025 -- Rex Cox MD    dextromethorphan-guaiFENesin  mg/5 ml (ROBITUSSIN-DM)  mg/5 mL liquid Take 5 mLs by mouth 4 (four) times daily as needed (cough). 120 mL 4/24/2025 5/4/2025 Rex Cox MD    benzonatate (TESSALON) 100 MG capsule Take 1 capsule (100 mg total) by mouth 3 (three) times daily as needed for Cough. 20 capsule 4/24/2025 5/4/2025 Rex Cox MD          Follow-up Information       Follow up With Specialties Details Why Contact [facility] ED Emergency Medicine  If symptoms worsen [address] [facility] [id number] [phone]    Primary care doctor  Schedule an appointment as soon as possible for a visit  for reassesment             DISCLAIMER: This note was prepared with [employer] voice recognition transcription software. Garbled syntax, mangled pronouns, and other bizarre constructions may be attributed to that software system.         [1]   Social History  Tobacco Use    Smoking status: Never    Smokeless tobacco: Never   Substance Use Topics    Alcohol use: Not Currently    Drug use: Never        Rex Cox MD  04/24/25 2013

## 2025-04-25 NOTE — DISCHARGE INSTRUCTIONS
Thank you for coming to our Emergency Department today. It is important to remember that some problems are difficult to diagnose and may not be found during your first visit. Be sure to follow up with your primary care doctor and review any labs/imaging that was performed with them. If you do not have a primary care doctor, you may contact the one listed on your discharge paperwork or you may also call the [facility] Appointment Desk at [phone] to schedule an appointment with one.     All medications may potentially have side effects and it is impossible to predict which medications may give you side effects. If you feel that you are having a negative effect of any medication you should immediately stop taking them and seek medical attention.    Return to the ER with any questions/concerns, new/concerning symptoms, worsening or failure to improve. Do not drive or make any important decisions for 24 hours if you have received any pain medications, sedatives or mood altering drugs during your ER visit.